# Patient Record
Sex: FEMALE | Race: OTHER | NOT HISPANIC OR LATINO
[De-identification: names, ages, dates, MRNs, and addresses within clinical notes are randomized per-mention and may not be internally consistent; named-entity substitution may affect disease eponyms.]

---

## 2017-05-04 ENCOUNTER — RX RENEWAL (OUTPATIENT)
Age: 72
End: 2017-05-04

## 2017-06-01 ENCOUNTER — LABORATORY RESULT (OUTPATIENT)
Age: 72
End: 2017-06-01

## 2017-06-05 ENCOUNTER — OUTPATIENT (OUTPATIENT)
Dept: OUTPATIENT SERVICES | Facility: HOSPITAL | Age: 72
LOS: 1 days | Discharge: HOME | End: 2017-06-05

## 2017-06-05 DIAGNOSIS — C50.919 MALIGNANT NEOPLASM OF UNSPECIFIED SITE OF UNSPECIFIED FEMALE BREAST: ICD-10-CM

## 2017-06-07 ENCOUNTER — APPOINTMENT (OUTPATIENT)
Dept: HEMATOLOGY ONCOLOGY | Facility: CLINIC | Age: 72
End: 2017-06-07

## 2017-06-07 VITALS
BODY MASS INDEX: 18.65 KG/M2 | TEMPERATURE: 97.8 F | DIASTOLIC BLOOD PRESSURE: 80 MMHG | RESPIRATION RATE: 16 BRPM | HEART RATE: 72 BPM | OXYGEN SATURATION: 99 % | SYSTOLIC BLOOD PRESSURE: 120 MMHG | WEIGHT: 119.05 LBS

## 2017-06-13 ENCOUNTER — APPOINTMENT (OUTPATIENT)
Dept: INTERNAL MEDICINE | Facility: CLINIC | Age: 72
End: 2017-06-13

## 2017-06-13 VITALS
HEART RATE: 68 BPM | HEIGHT: 67 IN | RESPIRATION RATE: 14 BRPM | DIASTOLIC BLOOD PRESSURE: 76 MMHG | SYSTOLIC BLOOD PRESSURE: 124 MMHG

## 2017-06-13 VITALS
WEIGHT: 119 LBS | SYSTOLIC BLOOD PRESSURE: 126 MMHG | RESPIRATION RATE: 14 BRPM | DIASTOLIC BLOOD PRESSURE: 78 MMHG | HEART RATE: 72 BPM | BODY MASS INDEX: 18.64 KG/M2

## 2017-07-24 ENCOUNTER — APPOINTMENT (OUTPATIENT)
Dept: SURGERY | Facility: CLINIC | Age: 72
End: 2017-07-24

## 2017-07-24 DIAGNOSIS — Z12.11 ENCOUNTER FOR SCREENING FOR MALIGNANT NEOPLASM OF COLON: ICD-10-CM

## 2017-08-01 ENCOUNTER — TRANSCRIPTION ENCOUNTER (OUTPATIENT)
Age: 72
End: 2017-08-01

## 2017-08-01 ENCOUNTER — OUTPATIENT (OUTPATIENT)
Dept: OUTPATIENT SERVICES | Facility: HOSPITAL | Age: 72
LOS: 1 days | Discharge: HOME | End: 2017-08-01
Payer: MEDICARE

## 2017-08-01 PROCEDURE — 45378 DIAGNOSTIC COLONOSCOPY: CPT

## 2017-08-01 PROCEDURE — G0105: CPT

## 2017-09-18 ENCOUNTER — OTHER (OUTPATIENT)
Age: 72
End: 2017-09-18

## 2017-10-30 ENCOUNTER — MEDICATION RENEWAL (OUTPATIENT)
Age: 72
End: 2017-10-30

## 2017-12-14 ENCOUNTER — OUTPATIENT (OUTPATIENT)
Dept: OUTPATIENT SERVICES | Facility: HOSPITAL | Age: 72
LOS: 1 days | Discharge: HOME | End: 2017-12-14

## 2017-12-14 DIAGNOSIS — C50.919 MALIGNANT NEOPLASM OF UNSPECIFIED SITE OF UNSPECIFIED FEMALE BREAST: ICD-10-CM

## 2017-12-15 ENCOUNTER — LABORATORY RESULT (OUTPATIENT)
Age: 72
End: 2017-12-15

## 2017-12-20 ENCOUNTER — APPOINTMENT (OUTPATIENT)
Dept: HEMATOLOGY ONCOLOGY | Facility: CLINIC | Age: 72
End: 2017-12-20
Payer: MEDICARE

## 2017-12-20 VITALS
HEART RATE: 79 BPM | BODY MASS INDEX: 18.65 KG/M2 | RESPIRATION RATE: 16 BRPM | WEIGHT: 119.05 LBS | DIASTOLIC BLOOD PRESSURE: 82 MMHG | TEMPERATURE: 97.9 F | OXYGEN SATURATION: 97 % | SYSTOLIC BLOOD PRESSURE: 134 MMHG

## 2017-12-20 PROCEDURE — 99213 OFFICE O/P EST LOW 20 MIN: CPT

## 2018-05-07 ENCOUNTER — MEDICATION RENEWAL (OUTPATIENT)
Age: 73
End: 2018-05-07

## 2018-06-22 ENCOUNTER — LABORATORY RESULT (OUTPATIENT)
Age: 73
End: 2018-06-22

## 2018-06-22 ENCOUNTER — OUTPATIENT (OUTPATIENT)
Dept: OUTPATIENT SERVICES | Facility: HOSPITAL | Age: 73
LOS: 1 days | Discharge: HOME | End: 2018-06-22

## 2018-06-22 DIAGNOSIS — C50.919 MALIGNANT NEOPLASM OF UNSPECIFIED SITE OF UNSPECIFIED FEMALE BREAST: ICD-10-CM

## 2018-06-27 ENCOUNTER — APPOINTMENT (OUTPATIENT)
Dept: HEMATOLOGY ONCOLOGY | Facility: CLINIC | Age: 73
End: 2018-06-27
Payer: MEDICARE

## 2018-06-27 VITALS
HEART RATE: 74 BPM | DIASTOLIC BLOOD PRESSURE: 78 MMHG | TEMPERATURE: 97.6 F | WEIGHT: 121.23 LBS | BODY MASS INDEX: 18.99 KG/M2 | SYSTOLIC BLOOD PRESSURE: 120 MMHG | OXYGEN SATURATION: 98 % | RESPIRATION RATE: 17 BRPM

## 2018-06-27 PROCEDURE — 99213 OFFICE O/P EST LOW 20 MIN: CPT

## 2018-06-27 RX ORDER — POLYETHYLENE GLYCOL 3350, SODIUM SULFATE, SODIUM CHLORIDE, POTASSIUM CHLORIDE, ASCORBIC ACID, SODIUM ASCORBATE 7.5-2.691G
100 KIT ORAL
Qty: 1 | Refills: 0 | Status: DISCONTINUED | COMMUNITY
Start: 2017-07-24 | End: 2018-06-27

## 2018-06-27 RX ORDER — OSELTAMIVIR PHOSPHATE 75 MG/1
75 CAPSULE ORAL DAILY
Qty: 10 | Refills: 0 | Status: DISCONTINUED | COMMUNITY
Start: 2018-02-21 | End: 2018-06-27

## 2018-08-01 ENCOUNTER — APPOINTMENT (OUTPATIENT)
Dept: INTERNAL MEDICINE | Facility: CLINIC | Age: 73
End: 2018-08-01
Payer: MEDICARE

## 2018-08-01 VITALS
SYSTOLIC BLOOD PRESSURE: 116 MMHG | BODY MASS INDEX: 23.56 KG/M2 | HEIGHT: 60 IN | WEIGHT: 120 LBS | DIASTOLIC BLOOD PRESSURE: 72 MMHG | RESPIRATION RATE: 16 BRPM | HEART RATE: 72 BPM

## 2018-08-01 PROCEDURE — 99214 OFFICE O/P EST MOD 30 MIN: CPT

## 2018-08-01 NOTE — HEALTH RISK ASSESSMENT
[No falls in past year] : Patient reported no falls in the past year [0] : 2) Feeling down, depressed, or hopeless: Not at all (0) [[id number]] : 0

## 2018-08-01 NOTE — HISTORY OF PRESENT ILLNESS
[Spouse] : spouse [FreeTextEntry1] : Comes to the office accompanied by her spouse for followup to discuss her medications and review her overall health [de-identified] : 73-year-old woman with a history of adenocarcinoma of the breast stage I on the right diabetes osteopenia and vitamin D deficiency comes to the office for followup accompanied by her  denies chest pain shortness of breath exertional dyspnea lightheadedness palpitations dizziness vertigo or syncope she has had no temperature chills sweats myalgias she denies cough dysuria headache sinus congestion pharyngitis wheezing or abdominal pain she's had no nausea vomiting diarrhea constipation rectal bleeding or melena her appetite has been good and her weight has been stable she is following a low carbohydrate diet she exercises regularly

## 2018-08-01 NOTE — REVIEW OF SYSTEMS
[Negative] : Heme/Lymph [Nasal Discharge] : nasal discharge [Fever] : no fever [Chills] : no chills [Fatigue] : no fatigue [Hot Flashes] : no hot flashes [Night Sweats] : no night sweats [Recent Change In Weight] : ~T no recent weight change [Discharge] : no discharge [Pain] : no pain [Redness] : no redness [Dryness] : no dryness  [Vision Problems] : no vision problems [Itching] : no itching [Earache] : no earache [Hearing Loss] : no hearing loss [Nosebleed] : no nosebleeds [Hoarseness] : no hoarseness [Sore Throat] : no sore throat [Postnasal Drip] : no postnasal drip [Chest Pain] : no chest pain [Palpitations] : no palpitations [Leg Claudication] : no leg claudication [Lower Ext Edema] : no lower extremity edema [Orthopnea] : no orthopnea [Paroysmal Nocturnal Dyspnea] : no paroysmal nocturnal dyspnea [Shortness Of Breath] : no shortness of breath [Wheezing] : no wheezing [Cough] : no cough [Dyspnea on Exertion] : no dyspnea on exertion [Abdominal Pain] : no abdominal pain [Nausea] : no nausea [Constipation] : no constipation [Diarrhea] : diarrhea [Vomiting] : no vomiting [Heartburn] : no heartburn [Melena] : no melena [Dysuria] : no dysuria [Incontinence] : no incontinence [Nocturia] : no nocturia [Poor Libido] : libido not poor [Hematuria] : no hematuria [Frequency] : no frequency [Vaginal Discharge] : no vaginal discharge [Dysmenorrhea] : no dysmenorrhea [Joint Pain] : no joint pain [Joint Stiffness] : no joint stiffness [Joint Swelling] : no joint swelling [Muscle Weakness] : no muscle weakness [Muscle Pain] : no muscle pain [Back Pain] : no back pain [Mole Changes] : no mole changes [Nail Changes] : no nail changes [Hair Changes] : no hair changes [Skin Rash] : no skin rash [Headache] : no headache [Dizziness] : no dizziness [Fainting] : no fainting [Memory Loss] : no memory loss [Unsteady Walking] : no ataxia [Suicidal] : not suicidal [Insomnia] : no insomnia [Anxiety] : no anxiety [Depression] : no depression [Easy Bleeding] : no easy bleeding [Easy Bruising] : no easy bruising [Swollen Glands] : no swollen glands

## 2018-08-01 NOTE — ASSESSMENT
[FreeTextEntry1] : Physical exam shows a well-developed female in no acute distress blood pressure 116/72 height 5 feet weight 120 pounds BMI 23.44 pulse is 72 and regular respiratory rate of 14 HEENT was unremarkable chest was clear cardiovascular exam was regular abdomen was soft and nontender extremities showed no clubbing cyanosis or edema neurologic exam was nonfocal...........Hemoglobin A1c was 6.6% previous hemoglobin A1c was 6.3% patient is maximizing her diet and exercise and with the level under 7% this is adequate.She is up-to-date on her ophthalmologist dermatologist gynecologist and will keep me informed of all tests done

## 2018-12-16 ENCOUNTER — OUTPATIENT (OUTPATIENT)
Dept: OUTPATIENT SERVICES | Facility: HOSPITAL | Age: 73
LOS: 1 days | Discharge: HOME | End: 2018-12-16

## 2018-12-16 DIAGNOSIS — C50.919 MALIGNANT NEOPLASM OF UNSPECIFIED SITE OF UNSPECIFIED FEMALE BREAST: ICD-10-CM

## 2018-12-20 ENCOUNTER — LABORATORY RESULT (OUTPATIENT)
Age: 73
End: 2018-12-20

## 2018-12-26 ENCOUNTER — APPOINTMENT (OUTPATIENT)
Dept: HEMATOLOGY ONCOLOGY | Facility: CLINIC | Age: 73
End: 2018-12-26
Payer: MEDICARE

## 2018-12-26 VITALS
TEMPERATURE: 97.7 F | OXYGEN SATURATION: 99 % | WEIGHT: 122.35 LBS | HEART RATE: 71 BPM | RESPIRATION RATE: 16 BRPM | SYSTOLIC BLOOD PRESSURE: 117 MMHG | BODY MASS INDEX: 23.9 KG/M2 | DIASTOLIC BLOOD PRESSURE: 73 MMHG

## 2018-12-26 PROCEDURE — 99213 OFFICE O/P EST LOW 20 MIN: CPT

## 2018-12-26 NOTE — REVIEW OF SYSTEMS
[Negative] : Allergic/Immunologic [FreeTextEntry2] : Energy level "off the charts". Declined  flu vaccination.  [FreeTextEntry7] : Last colonoscopy 08/2017, no polyps found [FreeTextEntry8] : Most recent appointment with GYN 01/2018, has upcoming appointment. Denies vaginal spotting/bleeding. [FreeTextEntry9] : Most recent bone density was 12/6/2018, stable osteopenia. [de-identified] : Was treated with calcipotriene and 5FU topically for precancerous lesions. [de-identified] : Fasting glucose was 155 on 6/22/2018. Hgb A1c 6.6.

## 2018-12-26 NOTE — HISTORY OF PRESENT ILLNESS
[Disease: _____________________] : Disease: [unfilled] [T: ___] : T[unfilled] [N: ___] : N[unfilled] [M: ___] : M[unfilled] [AJCC Stage: ____] : AJCC Stage: [unfilled] [Treatment Protocol] : Treatment Protocol [de-identified] : The patient presented in 1997, at age 52, with a mass in the right breast she discovered on breast self-examination.\par \par \par \par Dr. Tessy Bender performed an excisional biopsy on September 22, 1997 which demonstrated a 1.2 cm  well differentiated infiltrating ductal carcinoma which was ER positive (25%), LA positive (50%) and CerbB2 positive in 30% of the cells. There was a 10-20% component of DCIS.\par \par \par \par On October 13, 1997 the patient underwent right axillary lymph node dissection which demonstrated 24 negative axillary lymph nodes.\par \par \par \par Postoperatively the patient received adjuvant IV CMF extending from November 2, 1997 through April 27, 1998.\par \par \par \par The patient subsequently received radiation therapy at [facility] ([facility]) under the supervision of Dr. Rick Mendosa. \par \par \par \par The patient took tamoxifen from May 1998 through May 2003 and letrozole from March 2006 until March 2011.\par \par \par \par Amb CancerNext demonstrated variant of uncertain significance of BARD1(p.S761N) on 1/7/2015.\par \par \par \par  [de-identified] : well-differentiated infiltrating ductal carcinoma ER positive NH positive  [FreeTextEntry1] : On observation. [de-identified] : The patient has been 21  years 3 months  since breast cancer diagnosis. \par \par The patient completed chemotherapy 20 years 8 months ago. \par \par  The patient completed 5 years of Tamoxifen 15 years 7 months  ago and letrozole 7 years 3  months ago. \par \par Bilateral Mammogram/breast US 12/6/2018: breasts heterogeneously dense, Stable, BIRADS 2\par \par Most recent breast MRI 9/21/2017, BI RADS 2, \par \par Last saw breast surgeon Dr Bender in 01/2018, has follow up scheduled for 2/2019.\par \par Had yearly physical with PCP Dr Aamir Chicas 8/1/2019.\par \par No other intercurrent events.\par

## 2018-12-26 NOTE — REASON FOR VISIT
[Follow-Up Visit] : a follow-up [Other: _____] : [unfilled] [FreeTextEntry2] : infiltrating ductal carcinoma right breast right breast.

## 2018-12-26 NOTE — PHYSICAL EXAM
[Fully active, able to carry on all pre-disease performance without restriction] : Status 0 - Fully active, able to carry on all pre-disease performance without restriction [Thin] : thin [Normal] : affect appropriate [de-identified] : Right breast: scars UIQ and axilla,marked fibrocystic changes UOQ,  no mass. Left breast: scar UIQ,fibrocystic changes, no mass [de-identified] : Erythematous macules chest wall secondary to 5FU treatment.

## 2019-05-03 ENCOUNTER — APPOINTMENT (OUTPATIENT)
Dept: INTERNAL MEDICINE | Facility: CLINIC | Age: 74
End: 2019-05-03
Payer: MEDICARE

## 2019-05-03 ENCOUNTER — TRANSCRIPTION ENCOUNTER (OUTPATIENT)
Age: 74
End: 2019-05-03

## 2019-05-03 VITALS
HEIGHT: 60 IN | SYSTOLIC BLOOD PRESSURE: 120 MMHG | DIASTOLIC BLOOD PRESSURE: 74 MMHG | WEIGHT: 123 LBS | RESPIRATION RATE: 15 BRPM | BODY MASS INDEX: 24.15 KG/M2 | HEART RATE: 74 BPM

## 2019-05-03 VITALS — WEIGHT: 119 LBS | BODY MASS INDEX: 23.24 KG/M2

## 2019-05-03 LAB
CHOLEST SERPL-MCNC: 212 MG/DL
CHOLEST/HDLC SERPL: 3.1 RATIO
ESTIMATED AVERAGE GLUCOSE: 151 MG/DL
HBA1C MFR BLD HPLC: 6.9 %
HDLC SERPL-MCNC: 69 MG/DL
LDLC SERPL CALC-MCNC: 125 MG/DL
TRIGL SERPL-MCNC: 92 MG/DL

## 2019-05-03 PROCEDURE — 99215 OFFICE O/P EST HI 40 MIN: CPT

## 2019-05-04 NOTE — REVIEW OF SYSTEMS
[Nasal Discharge] : nasal discharge [Negative] : Heme/Lymph [Joint Stiffness] : joint stiffness [Fever] : no fever [Chills] : no chills [Fatigue] : no fatigue [Hot Flashes] : no hot flashes [Night Sweats] : no night sweats [Recent Change In Weight] : ~T no recent weight change [Discharge] : no discharge [Pain] : no pain [Redness] : no redness [Dryness] : no dryness  [Vision Problems] : no vision problems [Itching] : no itching [Earache] : no earache [Hearing Loss] : no hearing loss [Nosebleed] : no nosebleeds [Hoarseness] : no hoarseness [Sore Throat] : no sore throat [Postnasal Drip] : no postnasal drip [Palpitations] : no palpitations [Chest Pain] : no chest pain [Leg Claudication] : no leg claudication [Lower Ext Edema] : no lower extremity edema [Orthopnea] : no orthopnea [Paroysmal Nocturnal Dyspnea] : no paroysmal nocturnal dyspnea [Shortness Of Breath] : no shortness of breath [Wheezing] : no wheezing [Dyspnea on Exertion] : no dyspnea on exertion [Cough] : no cough [Abdominal Pain] : no abdominal pain [Nausea] : no nausea [Constipation] : no constipation [Diarrhea] : diarrhea [Vomiting] : no vomiting [Heartburn] : no heartburn [Melena] : no melena [Dysuria] : no dysuria [Incontinence] : no incontinence [Nocturia] : no nocturia [Poor Libido] : libido not poor [Hematuria] : no hematuria [Frequency] : no frequency [Vaginal Discharge] : no vaginal discharge [Joint Pain] : no joint pain [Dysmenorrhea] : no dysmenorrhea [Joint Swelling] : no joint swelling [Muscle Pain] : no muscle pain [Itching] : no itching [Back Pain] : no back pain [Muscle Weakness] : no muscle weakness [Mole Changes] : no mole changes [Hair Changes] : no hair changes [Nail Changes] : no nail changes [Headache] : no headache [Skin Rash] : no skin rash [Dizziness] : no dizziness [Fainting] : no fainting [Unsteady Walking] : no ataxia [Memory Loss] : no memory loss [Depression] : no depression [Insomnia] : no insomnia [Suicidal] : not suicidal [Anxiety] : no anxiety [Easy Bruising] : no easy bruising [Easy Bleeding] : no easy bleeding [Swollen Glands] : no swollen glands

## 2019-05-04 NOTE — HEALTH RISK ASSESSMENT
[No falls in past year] : Patient reported no falls in the past year [0] : 2) Feeling down, depressed, or hopeless: Not at all (0) [[id number]] : 0

## 2019-05-04 NOTE — HISTORY OF PRESENT ILLNESS
[Spouse] : spouse [FreeTextEntry1] : Comes to the office for followup to review her medications and discuss her overall health...She also is requesting repeat hemoglobin A1c and lipid profile [de-identified] : 74-year-old woman comes to the office accompanied by her  a history of type 2 diabetes adenocarcinoma of the breast stage I elevated cholesterol and vitamin D deficiency she denies temperature chills sweats or myalgias no headache sinus congestion sore throat cough wheezing pleurisy chest pain shortness of breath exertional dyspnea lightheadedness palpitations dizziness vertigo or syncope she has had no abdominal pain nausea vomiting diarrhea constipation bright red blood per rectum or black stools her appetite has been good her weight has been stable she exercises frequently denies significant musculoskeletal complaints

## 2019-05-04 NOTE — ASSESSMENT
[FreeTextEntry1] : Physical exam shows a well-developed female in no acute distress blood pressure 120/74 height 5 feet weight 119 pounds heart rate is 74 respirations 15 HEENT is unremarkable chest was clear cardiovascular was regular without murmurs abdomen was soft extremities showed no edema neurologic exam was nonfocal patient is up-to-date with her bone densities which show osteopenia and mammograms she had her last colonoscopy in August 2017 and keeps active with her ophthalmologist and dermatologist a slip was given for hemoglobin A1c and lipid profile she claims to be scheduled to have a blood tests with her oncologist

## 2019-05-04 NOTE — PHYSICAL EXAM
[Well Nourished] : well nourished [No Acute Distress] : no acute distress [Well Developed] : well developed [Well-Appearing] : well-appearing [Normal Voice/Communication] : normal voice/communication [Normal Sclera/Conjunctiva] : normal sclera/conjunctiva [PERRL] : pupils equal round and reactive to light [EOMI] : extraocular movements intact [Normal Outer Ear/Nose] : the outer ears and nose were normal in appearance [Normal Oropharynx] : the oropharynx was normal [Normal TMs] : both tympanic membranes were normal [Normal Nasal Mucosa] : the nasal mucosa was normal [No JVD] : no jugular venous distention [Supple] : supple [Thyroid Normal, No Nodules] : the thyroid was normal and there were no nodules present [No Lymphadenopathy] : no lymphadenopathy [Clear to Auscultation] : lungs were clear to auscultation bilaterally [No Accessory Muscle Use] : no accessory muscle use [No Respiratory Distress] : no respiratory distress  [Normal Percussion] : the chest was normal to percussion [Regular Rhythm] : with a regular rhythm [Normal Rate] : normal rate  [No Murmur] : no murmur heard [Normal S1, S2] : normal S1 and S2 [No Abdominal Bruit] : a ~M bruit was not heard ~T in the abdomen [No Carotid Bruits] : no carotid bruits [Pedal Pulses Present] : the pedal pulses are present [No Varicosities] : no varicosities [No Extremity Clubbing/Cyanosis] : no extremity clubbing/cyanosis [No Edema] : there was no peripheral edema [No Palpable Aorta] : no palpable aorta [Declined Breast Exam] : declined breast exam  [Soft] : abdomen soft [No Masses] : no abdominal mass palpated [Non-distended] : non-distended [Non Tender] : non-tender [No HSM] : no HSM [Normal Bowel Sounds] : normal bowel sounds [Declined Rectal Exam] : declined rectal exam [No Hernias] : no hernias [Normal Axillary Nodes] : no axillary lymphadenopathy [Normal Supraclavicular Nodes] : no supraclavicular lymphadenopathy [Normal Femoral Nodes] : no femoral lymphadenopathy [Normal Posterior Cervical Nodes] : no posterior cervical lymphadenopathy [Normal Inguinal Nodes] : no inguinal lymphadenopathy [Normal Anterior Cervical Nodes] : no anterior cervical lymphadenopathy [No CVA Tenderness] : no CVA  tenderness [No Spinal Tenderness] : no spinal tenderness [Grossly Normal Strength/Tone] : grossly normal strength/tone [No Joint Swelling] : no joint swelling [No Skin Lesions] : no skin lesions [No Rash] : no rash [Coordination Grossly Intact] : coordination grossly intact [Normal Gait] : normal gait [Deep Tendon Reflexes (DTR)] : deep tendon reflexes were 2+ and symmetric [No Focal Deficits] : no focal deficits [Speech Grossly Normal] : speech grossly normal [Memory Grossly Normal] : memory grossly normal [Normal Affect] : the affect was normal [Alert and Oriented x3] : oriented to person, place, and time [Normal Mood] : the mood was normal [Normal Insight/Judgement] : insight and judgment were intact [Kyphosis] : no kyphosis [Scoliosis] : no scoliosis [Acne] : no acne

## 2019-06-24 ENCOUNTER — OUTPATIENT (OUTPATIENT)
Dept: OUTPATIENT SERVICES | Facility: HOSPITAL | Age: 74
LOS: 1 days | Discharge: HOME | End: 2019-06-24

## 2019-06-24 DIAGNOSIS — C50.919 MALIGNANT NEOPLASM OF UNSPECIFIED SITE OF UNSPECIFIED FEMALE BREAST: ICD-10-CM

## 2019-06-25 LAB
ALBUMIN SERPL ELPH-MCNC: 4.9 G/DL
ALP BLD-CCNC: 78 U/L
ALT SERPL-CCNC: 21 U/L
ANION GAP SERPL CALC-SCNC: 13 MMOL/L
AST SERPL-CCNC: 19 U/L
BASOPHILS # BLD AUTO: 0.05 K/UL
BASOPHILS NFR BLD AUTO: 0.9 %
BILIRUB SERPL-MCNC: 1.5 MG/DL
BUN SERPL-MCNC: 11 MG/DL
CALCIUM SERPL-MCNC: 10 MG/DL
CHLORIDE SERPL-SCNC: 103 MMOL/L
CO2 SERPL-SCNC: 27 MMOL/L
CREAT SERPL-MCNC: 0.81 MG/DL
EOSINOPHIL # BLD AUTO: 0.09 K/UL
EOSINOPHIL NFR BLD AUTO: 1.6 %
GLUCOSE SERPL-MCNC: 160 MG/DL
HCT VFR BLD CALC: 44 %
HGB BLD-MCNC: 15 G/DL
IMM GRANULOCYTES NFR BLD AUTO: 0.4 %
LYMPHOCYTES # BLD AUTO: 1.54 K/UL
LYMPHOCYTES NFR BLD AUTO: 27.8 %
MAN DIFF?: NORMAL
MCHC RBC-ENTMCNC: 30.4 PG
MCHC RBC-ENTMCNC: 34.1 GM/DL
MCV RBC AUTO: 89.1 FL
MONOCYTES # BLD AUTO: 0.47 K/UL
MONOCYTES NFR BLD AUTO: 8.5 %
NEUTROPHILS # BLD AUTO: 3.37 K/UL
NEUTROPHILS NFR BLD AUTO: 60.8 %
PLATELET # BLD AUTO: 240 K/UL
POTASSIUM SERPL-SCNC: 4.4 MMOL/L
PROT SERPL-MCNC: 6.9 G/DL
RBC # BLD: 4.94 M/UL
RBC # FLD: 12.6 %
SODIUM SERPL-SCNC: 143 MMOL/L
WBC # FLD AUTO: 5.54 K/UL

## 2019-06-28 ENCOUNTER — APPOINTMENT (OUTPATIENT)
Dept: HEMATOLOGY ONCOLOGY | Facility: CLINIC | Age: 74
End: 2019-06-28
Payer: MEDICARE

## 2019-06-28 VITALS
BODY MASS INDEX: 23.85 KG/M2 | HEART RATE: 69 BPM | TEMPERATURE: 99.1 F | OXYGEN SATURATION: 100 % | WEIGHT: 122.11 LBS | SYSTOLIC BLOOD PRESSURE: 123 MMHG | RESPIRATION RATE: 16 BRPM | DIASTOLIC BLOOD PRESSURE: 77 MMHG

## 2019-06-28 PROCEDURE — 99213 OFFICE O/P EST LOW 20 MIN: CPT

## 2019-06-28 NOTE — REVIEW OF SYSTEMS
[Negative] : Integumentary [FreeTextEntry2] : Energy level  normal.. Declined  flu vaccination.  [FreeTextEntry6] : Has dry throat and occasional cough. [FreeTextEntry7] : Last colonoscopy 08/2017, no polyps found [FreeTextEntry8] : Most recent appointment with GYN 01/2019, Dr Mtz.  Denies vaginal spotting/bleeding. [FreeTextEntry9] : Most recent bone density was 12/6/2018, stable osteopenia. [de-identified] : Most recent derm exam spring 2019. [de-identified] : Fasting glucose was 155 on 6/22/2018. Hgb A1c 6.6.

## 2019-06-28 NOTE — HISTORY OF PRESENT ILLNESS
[Disease: _____________________] : Disease: [unfilled] [T: ___] : T[unfilled] [AJCC Stage: ____] : AJCC Stage: [unfilled] [M: ___] : M[unfilled] [N: ___] : N[unfilled] [Treatment Protocol] : Treatment Protocol [de-identified] : The patient presented in 1997, at age 52, with a mass in the right breast she discovered on breast self-examination.\par \par \par \par Dr. Tessy Bender performed an excisional biopsy on September 22, 1997 which demonstrated a 1.2 cm  well differentiated infiltrating ductal carcinoma which was ER positive (25%), FL positive (50%) and CerbB2 positive in 30% of the cells. There was a 10-20% component of DCIS.\par \par \par \par On October 13, 1997 the patient underwent right axillary lymph node dissection which demonstrated 24 negative axillary lymph nodes.\par \par \par \par Postoperatively the patient received adjuvant IV CMF extending from November 2, 1997 through April 27, 1998.\par \par \par \par The patient subsequently received radiation therapy at [facility] ([facility]) under the supervision of Dr. iRck Mendosa. \par \par \par \par The patient took tamoxifen from May 1998 through May 2003 and letrozole from March 2006 until March 2011.\par \par \par \par Amb CancerNext demonstrated variant of uncertain significance of BARD1(p.S761N) on 1/7/2015.\par \par \par \par  [de-identified] : well-differentiated infiltrating ductal carcinoma ER positive FL positive  [FreeTextEntry1] : On observation. [de-identified] : The patient has been 21  years 9 months  since breast cancer diagnosis. \par \par The patient completed chemotherapy 21 years 2 months ago. \par \par  The patient completed 5 years of Tamoxifen 16 years 1 month  ago and letrozole 7 years 9  months ago. \par \par Bilateral Mammogram/breast US 12/6/2018: breasts heterogeneously dense, Stable, BIRADS 2\par \par Last saw Dr Bender approximately 3/2019. Breast MRI was not ordered.\par \par Most recent breast MRI 9/21/2017, BI RADS 2..\par \par Had yearly physical with PCP Dr Aamir Chicas 5/3/2019.\par \par No other intercurrent events.\par

## 2019-06-28 NOTE — PHYSICAL EXAM
[Fully active, able to carry on all pre-disease performance without restriction] : Status 0 - Fully active, able to carry on all pre-disease performance without restriction [Thin] : thin [Normal] : grossly intact [de-identified] : Right breast: scars UIQ and axilla, fibrocystic changes UOQ,  no mass. Left breast: scar UIQ,fibrocystic changes, no mass [de-identified] : Erythematous macules chest wall secondary to 5FU treatment.

## 2019-07-11 ENCOUNTER — RX RENEWAL (OUTPATIENT)
Age: 74
End: 2019-07-11

## 2019-10-09 ENCOUNTER — APPOINTMENT (OUTPATIENT)
Dept: INTERNAL MEDICINE | Facility: CLINIC | Age: 74
End: 2019-10-09
Payer: MEDICARE

## 2019-10-09 ENCOUNTER — FORM ENCOUNTER (OUTPATIENT)
Age: 74
End: 2019-10-09

## 2019-10-09 VITALS
DIASTOLIC BLOOD PRESSURE: 78 MMHG | WEIGHT: 123 LBS | SYSTOLIC BLOOD PRESSURE: 126 MMHG | BODY MASS INDEX: 24.15 KG/M2 | HEIGHT: 60 IN | HEART RATE: 72 BPM | RESPIRATION RATE: 15 BRPM

## 2019-10-09 DIAGNOSIS — R14.0 ABDOMINAL DISTENSION (GASEOUS): ICD-10-CM

## 2019-10-09 PROCEDURE — 99215 OFFICE O/P EST HI 40 MIN: CPT

## 2019-10-10 ENCOUNTER — OUTPATIENT (OUTPATIENT)
Dept: OUTPATIENT SERVICES | Facility: HOSPITAL | Age: 74
LOS: 1 days | End: 2019-10-10
Payer: MEDICARE

## 2019-10-10 ENCOUNTER — APPOINTMENT (OUTPATIENT)
Dept: ULTRASOUND IMAGING | Facility: HOSPITAL | Age: 74
End: 2019-10-10
Payer: MEDICARE

## 2019-10-10 DIAGNOSIS — R14.0 ABDOMINAL DISTENSION (GASEOUS): ICD-10-CM

## 2019-10-10 PROCEDURE — 76700 US EXAM ABDOM COMPLETE: CPT | Mod: 26

## 2019-10-10 PROCEDURE — 76700 US EXAM ABDOM COMPLETE: CPT

## 2019-10-10 NOTE — HISTORY OF PRESENT ILLNESS
[Spouse] : spouse [FreeTextEntry1] : Comes to the office for followup to review her medications and to discuss her overall health she also wishes to receive a slip for comprehensive medical blood tests [de-identified] : A 74-year-old woman comes to the office accompanied by her  with a history of breast cancer type 2 diabetes hyperlipidemia osteopenia and vitamin D deficiency patient denies temperature chills sweats or myalgias she's had no headache sinus congestion sore throat cough wheezing pleurisy chest pain shortness of breath exertional dyspnea lightheadedness palpitations dizziness vertigo or syncope she denies abdominal pain occasional bloating she has had no nausea vomiting diarrhea constipation bright red blood per rectum or black stools her appetite has been good her weight has been stable she denies significant musculoskeletal complaints she has had no dysuria or gross hematuria she denies leg edema or any recent skin rashes

## 2019-10-10 NOTE — ASSESSMENT
[FreeTextEntry1] : Physical exam shows a well-developed female in no acute distress blood pressure 126/78 pulse is 72 respiratory rate of 15 HEENT was unremarkable chest was clear cardiovascular exam was regular with no extra heart sounds or murmurs abdomen was soft and nontender extremities showed no clubbing cyanosis or edema neurologic exam was nonfocal patient was given a slip for a mammogram and breast sonogram she also requests an abdominal ultrasound to investigate some nonspecific bloating slip also was given for complete blood tests including CBC full chemistries lipid profile thyroid profile hemoglobin A1c urinalysis CRP vitamins D3 and B12 she underwent her last mammogram in December of 2018 and follows closely with her oncologist and gynecologist Dr. Emily Walsh. She is up-to-date with her ophthalmologist and dermatologist and underwent her last colonoscopy in August of 2017

## 2019-10-10 NOTE — PHYSICAL EXAM
[No Acute Distress] : no acute distress [Well Developed] : well developed [Well Nourished] : well nourished [Well-Appearing] : well-appearing [Normal Voice/Communication] : normal voice/communication [Normal Sclera/Conjunctiva] : normal sclera/conjunctiva [EOMI] : extraocular movements intact [PERRL] : pupils equal round and reactive to light [Normal Outer Ear/Nose] : the outer ears and nose were normal in appearance [Normal TMs] : both tympanic membranes were normal [Normal Oropharynx] : the oropharynx was normal [Normal Nasal Mucosa] : the nasal mucosa was normal [No JVD] : no jugular venous distention [Supple] : supple [Thyroid Normal, No Nodules] : the thyroid was normal and there were no nodules present [No Lymphadenopathy] : no lymphadenopathy [No Respiratory Distress] : no respiratory distress  [No Accessory Muscle Use] : no accessory muscle use [Clear to Auscultation] : lungs were clear to auscultation bilaterally [Normal Percussion] : the chest was normal to percussion [Normal Rate] : normal rate  [Normal S1, S2] : normal S1 and S2 [Regular Rhythm] : with a regular rhythm [No Carotid Bruits] : no carotid bruits [No Murmur] : no murmur heard [No Abdominal Bruit] : a ~M bruit was not heard ~T in the abdomen [Pedal Pulses Present] : the pedal pulses are present [No Varicosities] : no varicosities [No Palpable Aorta] : no palpable aorta [No Edema] : there was no peripheral edema [Soft] : abdomen soft [No Extremity Clubbing/Cyanosis] : no extremity clubbing/cyanosis [Declined Breast Exam] : declined breast exam  [Non Tender] : non-tender [Non-distended] : non-distended [No HSM] : no HSM [Normal Bowel Sounds] : normal bowel sounds [No Masses] : no abdominal mass palpated [No Hernias] : no hernias [Declined Rectal Exam] : declined rectal exam [Normal Axillary Nodes] : no axillary lymphadenopathy [Normal Supraclavicular Nodes] : no supraclavicular lymphadenopathy [Normal Anterior Cervical Nodes] : no anterior cervical lymphadenopathy [Normal Posterior Cervical Nodes] : no posterior cervical lymphadenopathy [Normal Inguinal Nodes] : no inguinal lymphadenopathy [Normal Femoral Nodes] : no femoral lymphadenopathy [No CVA Tenderness] : no CVA  tenderness [No Spinal Tenderness] : no spinal tenderness [No Rash] : no rash [No Joint Swelling] : no joint swelling [Grossly Normal Strength/Tone] : grossly normal strength/tone [No Skin Lesions] : no skin lesions [Coordination Grossly Intact] : coordination grossly intact [Normal Gait] : normal gait [No Focal Deficits] : no focal deficits [Deep Tendon Reflexes (DTR)] : deep tendon reflexes were 2+ and symmetric [Memory Grossly Normal] : memory grossly normal [Speech Grossly Normal] : speech grossly normal [Normal Affect] : the affect was normal [Alert and Oriented x3] : oriented to person, place, and time [Normal Mood] : the mood was normal [Normal Insight/Judgement] : insight and judgment were intact [Kyphosis] : no kyphosis [Scoliosis] : no scoliosis [Acne] : no acne

## 2019-10-10 NOTE — REVIEW OF SYSTEMS
[Nasal Discharge] : nasal discharge [Joint Stiffness] : joint stiffness [Negative] : Neurological [Fever] : no fever [Chills] : no chills [Fatigue] : no fatigue [Hot Flashes] : no hot flashes [Night Sweats] : no night sweats [Discharge] : no discharge [Recent Change In Weight] : ~T no recent weight change [Pain] : no pain [Dryness] : no dryness  [Redness] : no redness [Vision Problems] : no vision problems [Itching] : no itching [Nosebleed] : no nosebleeds [Hearing Loss] : no hearing loss [Earache] : no earache [Hoarseness] : no hoarseness [Sore Throat] : no sore throat [Postnasal Drip] : no postnasal drip [Palpitations] : no palpitations [Chest Pain] : no chest pain [Leg Claudication] : no leg claudication [Lower Ext Edema] : no lower extremity edema [Orthopnea] : no orthopnea [Paroxysmal Nocturnal Dyspnea] : no paroxysmal nocturnal dyspnea [Shortness Of Breath] : no shortness of breath [Wheezing] : no wheezing [Dyspnea on Exertion] : no dyspnea on exertion [Abdominal Pain] : no abdominal pain [Cough] : no cough [Constipation] : no constipation [Nausea] : no nausea [Vomiting] : no vomiting [Diarrhea] : diarrhea [Heartburn] : no heartburn [Dysuria] : no dysuria [Melena] : no melena [Nocturia] : no nocturia [Incontinence] : no incontinence [Poor Libido] : libido not poor [Hematuria] : no hematuria [Vaginal Discharge] : no vaginal discharge [Frequency] : no frequency [Dysmenorrhea] : no dysmenorrhea [Joint Pain] : no joint pain [Joint Swelling] : no joint swelling [Muscle Weakness] : no muscle weakness [Muscle Pain] : no muscle pain [Back Pain] : no back pain [Mole Changes] : no mole changes [Hair Changes] : no hair changes [Nail Changes] : no nail changes [Skin Rash] : no skin rash [Headache] : no headache [Dizziness] : no dizziness [Memory Loss] : no memory loss [Fainting] : no fainting [Unsteady Walking] : no ataxia [Suicidal] : not suicidal [Insomnia] : no insomnia [Anxiety] : no anxiety [Depression] : no depression [Easy Bleeding] : no easy bleeding [Easy Bruising] : no easy bruising [Swollen Glands] : no swollen glands

## 2019-10-16 LAB
25(OH)D3 SERPL-MCNC: 55.1 NG/ML
ALBUMIN SERPL ELPH-MCNC: 5 G/DL
ALP BLD-CCNC: 85 U/L
ALT SERPL-CCNC: 19 U/L
ANION GAP SERPL CALC-SCNC: 12 MMOL/L
APPEARANCE: CLEAR
AST SERPL-CCNC: 19 U/L
BASOPHILS # BLD AUTO: 0.07 K/UL
BASOPHILS NFR BLD AUTO: 1.3 %
BILIRUB SERPL-MCNC: 1.1 MG/DL
BILIRUBIN URINE: NEGATIVE
BLOOD URINE: NEGATIVE
BUN SERPL-MCNC: 12 MG/DL
CALCIUM SERPL-MCNC: 9.7 MG/DL
CHLORIDE SERPL-SCNC: 103 MMOL/L
CHOLEST SERPL-MCNC: 220 MG/DL
CHOLEST/HDLC SERPL: 3.5 RATIO
CO2 SERPL-SCNC: 30 MMOL/L
COLOR: YELLOW
CREAT SERPL-MCNC: 0.65 MG/DL
CRP SERPL HS-MCNC: 0.22 MG/L
EOSINOPHIL # BLD AUTO: 0.09 K/UL
EOSINOPHIL NFR BLD AUTO: 1.7 %
ESTIMATED AVERAGE GLUCOSE: 128 MG/DL
GLUCOSE BS SERPL-MCNC: 116 MG/DL
GLUCOSE QUALITATIVE U: NEGATIVE
GLUCOSE SERPL-MCNC: 129 MG/DL
HBA1C MFR BLD HPLC: 6.1 %
HCT VFR BLD CALC: 43.2 %
HDLC SERPL-MCNC: 63 MG/DL
HGB BLD-MCNC: 14.7 G/DL
IMM GRANULOCYTES NFR BLD AUTO: 0.4 %
KETONES URINE: NEGATIVE
LDLC SERPL CALC-MCNC: 134 MG/DL
LEUKOCYTE ESTERASE URINE: NEGATIVE
LYMPHOCYTES # BLD AUTO: 1.68 K/UL
LYMPHOCYTES NFR BLD AUTO: 31.5 %
MAN DIFF?: NORMAL
MCHC RBC-ENTMCNC: 30.7 PG
MCHC RBC-ENTMCNC: 34 GM/DL
MCV RBC AUTO: 90.2 FL
MEV IGG FLD QL IA: >300 AU/ML
MEV IGG+IGM SER-IMP: POSITIVE
MONOCYTES # BLD AUTO: 0.38 K/UL
MONOCYTES NFR BLD AUTO: 7.1 %
MUV AB SER-ACNC: NEGATIVE
MUV IGG SER QL IA: 7.5 AU/ML
NEUTROPHILS # BLD AUTO: 3.09 K/UL
NEUTROPHILS NFR BLD AUTO: 58 %
NITRITE URINE: NEGATIVE
PH URINE: 6.5
PLATELET # BLD AUTO: 249 K/UL
POTASSIUM SERPL-SCNC: 4.4 MMOL/L
PROT SERPL-MCNC: 7 G/DL
PROTEIN URINE: NORMAL
RBC # BLD: 4.79 M/UL
RBC # FLD: 12.9 %
RUBV IGG FLD-ACNC: 6.9 INDEX
RUBV IGG SER-IMP: POSITIVE
SODIUM SERPL-SCNC: 145 MMOL/L
SPECIFIC GRAVITY URINE: 1.03
T4 FREE SERPL-MCNC: 1.1 NG/DL
TRIGL SERPL-MCNC: 116 MG/DL
TSH SERPL-ACNC: 0.48 UIU/ML
UROBILINOGEN URINE: NORMAL
VIT B12 SERPL-MCNC: 567 PG/ML
WBC # FLD AUTO: 5.33 K/UL

## 2020-01-07 ENCOUNTER — MEDICATION RENEWAL (OUTPATIENT)
Age: 75
End: 2020-01-07

## 2020-01-12 ENCOUNTER — OUTPATIENT (OUTPATIENT)
Dept: OUTPATIENT SERVICES | Facility: HOSPITAL | Age: 75
LOS: 1 days | Discharge: HOME | End: 2020-01-12

## 2020-01-12 DIAGNOSIS — C50.919 MALIGNANT NEOPLASM OF UNSPECIFIED SITE OF UNSPECIFIED FEMALE BREAST: ICD-10-CM

## 2020-01-12 LAB
ALBUMIN SERPL ELPH-MCNC: 4.4 G/DL
ALP BLD-CCNC: 75 U/L
ALT SERPL-CCNC: 18 U/L
ANION GAP SERPL CALC-SCNC: 13 MMOL/L
AST SERPL-CCNC: 18 U/L
BASOPHILS # BLD AUTO: 0.04 K/UL
BASOPHILS NFR BLD AUTO: 1 %
BILIRUB SERPL-MCNC: 0.8 MG/DL
BUN SERPL-MCNC: 12 MG/DL
CALCIUM SERPL-MCNC: 9.5 MG/DL
CHLORIDE SERPL-SCNC: 100 MMOL/L
CO2 SERPL-SCNC: 29 MMOL/L
CREAT SERPL-MCNC: 0.68 MG/DL
EOSINOPHIL # BLD AUTO: 0.14 K/UL
EOSINOPHIL NFR BLD AUTO: 3.3 %
GLUCOSE SERPL-MCNC: 109 MG/DL
HCT VFR BLD CALC: 41.1 %
HGB BLD-MCNC: 13.9 G/DL
IMM GRANULOCYTES NFR BLD AUTO: 0.2 %
LYMPHOCYTES # BLD AUTO: 1.48 K/UL
LYMPHOCYTES NFR BLD AUTO: 35.3 %
MAN DIFF?: NORMAL
MCHC RBC-ENTMCNC: 30 PG
MCHC RBC-ENTMCNC: 33.8 GM/DL
MCV RBC AUTO: 88.6 FL
MONOCYTES # BLD AUTO: 0.4 K/UL
MONOCYTES NFR BLD AUTO: 9.5 %
NEUTROPHILS # BLD AUTO: 2.12 K/UL
NEUTROPHILS NFR BLD AUTO: 50.7 %
PLATELET # BLD AUTO: 244 K/UL
POTASSIUM SERPL-SCNC: 4.2 MMOL/L
PROT SERPL-MCNC: 6.1 G/DL
RBC # BLD: 4.64 M/UL
RBC # FLD: 12.8 %
SODIUM SERPL-SCNC: 142 MMOL/L
WBC # FLD AUTO: 4.19 K/UL

## 2020-01-13 ENCOUNTER — APPOINTMENT (OUTPATIENT)
Dept: HEMATOLOGY ONCOLOGY | Facility: CLINIC | Age: 75
End: 2020-01-13
Payer: MEDICARE

## 2020-01-13 VITALS
OXYGEN SATURATION: 100 % | BODY MASS INDEX: 23.03 KG/M2 | RESPIRATION RATE: 16 BRPM | TEMPERATURE: 97.5 F | SYSTOLIC BLOOD PRESSURE: 131 MMHG | HEART RATE: 72 BPM | DIASTOLIC BLOOD PRESSURE: 72 MMHG | WEIGHT: 117.95 LBS

## 2020-01-13 PROCEDURE — 99214 OFFICE O/P EST MOD 30 MIN: CPT

## 2020-01-13 NOTE — REVIEW OF SYSTEMS
[Negative] : Respiratory [FreeTextEntry2] : Energy level "off the charts", doing flamenco dancing. normal.. Declined  flu vaccination.  Had both doses of Shingrix. [FreeTextEntry7] : Last colonoscopy 08/2017, no polyps found. Next colonoscopy 8/2020. [FreeTextEntry8] : Most recent appointment with GYN 01/2019, Dr Mtz.  Denies vaginal spotting/bleeding.Plans to schedule follow up. [FreeTextEntry9] : Most recent bone density was 12/6/2018, stable osteopenia. [de-identified] : Most recent derm exam 3/ 2019. patient states she is  up-to-date.. [de-identified] : See interval history

## 2020-01-13 NOTE — HISTORY OF PRESENT ILLNESS
[Disease: _____________________] : Disease: [unfilled] [T: ___] : T[unfilled] [N: ___] : N[unfilled] [M: ___] : M[unfilled] [Treatment Protocol] : Treatment Protocol [AJCC Stage: ____] : AJCC Stage: [unfilled] [de-identified] : The patient presented in 1997, at age 52, with a mass in the right breast she discovered on breast self-examination.\par \par \par \par Dr. Tessy Bender performed an excisional biopsy on September 22, 1997 which demonstrated a 1.2 cm  well differentiated infiltrating ductal carcinoma which was ER positive (25%), [place] positive (50%) and CerbB2 positive in 30% of the cells. There was a 10-20% component of DCIS.\par \par \par \par On October 13, 1997 the patient underwent right axillary lymph node dissection which demonstrated 24 negative axillary lymph nodes.\par \par \par \par Postoperatively the patient received adjuvant IV CMF extending from November 2, 1997 through April 27, 1998.\par \par \par \par The patient subsequently received radiation therapy at [facility] ([facility]) under the supervision of Dr. Rick Mendosa. 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 [de-identified] : The patient has been 22   years 3  months  since breast cancer diagnosis. \par \par The patient completed chemotherapy 21 years 8 months ago. \par \par  The patient completed 5 years of Tamoxifen 16 years 7  months  ago and letrozole 8  years 3  months ago. \par \par Bilateral Mammogram/breast US performed in 12/2019, patient was told it was OK. I will request report.\par .\par Last saw Dr Bender 3/2019.\par \par Most recent breast MRI 9/21/2017, BI RADS 2..\par \par Last saw PCP Dr Aamir Chicas 10/2019, metformin dose increased.\par \par Patient was sent for abdominal US 10/10/2019, 6.1 cm right renal cyst found.\par \par No other intercurrent events.\par

## 2020-01-13 NOTE — PHYSICAL EXAM
[Fully active, able to carry on all pre-disease performance without restriction] : Status 0 - Fully active, able to carry on all pre-disease performance without restriction [Thin] : thin [Normal] : normal appearance, no rash, nodules, vesicles, ulcers, erythema [de-identified] : Right breast: scars UIQ and axilla, fibrocystic changes UOQ,  no mass. Left breast: scar UIQ,fibrocystic changes, no mass

## 2020-06-01 ENCOUNTER — LABORATORY RESULT (OUTPATIENT)
Age: 75
End: 2020-06-01

## 2020-06-01 LAB
25(OH)D3 SERPL-MCNC: 56.7 NG/ML
ALBUMIN SERPL ELPH-MCNC: 4.9 G/DL
ALP BLD-CCNC: 70 U/L
ALT SERPL-CCNC: 17 U/L
ANION GAP SERPL CALC-SCNC: 16 MMOL/L
APPEARANCE: CLEAR
AST SERPL-CCNC: 19 U/L
BASOPHILS # BLD AUTO: 0.05 K/UL
BASOPHILS NFR BLD AUTO: 0.9 %
BILIRUB SERPL-MCNC: 1.3 MG/DL
BILIRUBIN URINE: NEGATIVE
BLOOD URINE: NEGATIVE
BUN SERPL-MCNC: 10 MG/DL
CALCIUM SERPL-MCNC: 9.9 MG/DL
CHLORIDE SERPL-SCNC: 100 MMOL/L
CHOLEST SERPL-MCNC: 217 MG/DL
CHOLEST/HDLC SERPL: 3.6 RATIO
CO2 SERPL-SCNC: 28 MMOL/L
COLOR: YELLOW
CREAT SERPL-MCNC: 0.66 MG/DL
CRP SERPL HS-MCNC: 0.24 MG/L
EOSINOPHIL # BLD AUTO: 0.12 K/UL
EOSINOPHIL NFR BLD AUTO: 2.1 %
ESTIMATED AVERAGE GLUCOSE: 131 MG/DL
GLUCOSE BS SERPL-MCNC: 118 MG/DL
GLUCOSE QUALITATIVE U: NEGATIVE
GLUCOSE SERPL-MCNC: 124 MG/DL
HBA1C MFR BLD HPLC: 6.2 %
HCT VFR BLD CALC: 43.2 %
HDLC SERPL-MCNC: 60 MG/DL
HGB BLD-MCNC: 14.7 G/DL
IMM GRANULOCYTES NFR BLD AUTO: 0.4 %
KETONES URINE: NEGATIVE
LDLC SERPL CALC-MCNC: 133 MG/DL
LEUKOCYTE ESTERASE URINE: NEGATIVE
LYMPHOCYTES # BLD AUTO: 1.54 K/UL
LYMPHOCYTES NFR BLD AUTO: 27.2 %
MAN DIFF?: NORMAL
MCHC RBC-ENTMCNC: 30.4 PG
MCHC RBC-ENTMCNC: 34 GM/DL
MCV RBC AUTO: 89.3 FL
MONOCYTES # BLD AUTO: 0.41 K/UL
MONOCYTES NFR BLD AUTO: 7.2 %
NEUTROPHILS # BLD AUTO: 3.53 K/UL
NEUTROPHILS NFR BLD AUTO: 62.2 %
NITRITE URINE: NEGATIVE
PH URINE: 7.5
PLATELET # BLD AUTO: 257 K/UL
POTASSIUM SERPL-SCNC: 4.2 MMOL/L
PROT SERPL-MCNC: 6.9 G/DL
PROTEIN URINE: ABNORMAL
RBC # BLD: 4.84 M/UL
RBC # FLD: 13 %
SODIUM SERPL-SCNC: 144 MMOL/L
SPECIFIC GRAVITY URINE: 1.02
T4 FREE SERPL-MCNC: 1.1 NG/DL
TRIGL SERPL-MCNC: 123 MG/DL
TSH SERPL-ACNC: 0.49 UIU/ML
UROBILINOGEN URINE: NORMAL
VIT B12 SERPL-MCNC: 537 PG/ML
WBC # FLD AUTO: 5.67 K/UL

## 2020-06-02 LAB
MEV IGG FLD QL IA: >300 AU/ML
MEV IGG+IGM SER-IMP: POSITIVE
MUV AB SER-ACNC: NEGATIVE
MUV IGG SER QL IA: 5.9 AU/ML
RUBV IGG FLD-ACNC: 6.5 INDEX
RUBV IGG SER-IMP: POSITIVE
SARS-COV-2 IGG SERPL IA-ACNC: <3.8 AU/ML
SARS-COV-2 IGG SERPL QL IA: NEGATIVE

## 2020-06-04 ENCOUNTER — APPOINTMENT (OUTPATIENT)
Dept: INTERNAL MEDICINE | Facility: CLINIC | Age: 75
End: 2020-06-04
Payer: MEDICARE

## 2020-06-04 PROCEDURE — G0444 DEPRESSION SCREEN ANNUAL: CPT | Mod: 59,95

## 2020-06-04 PROCEDURE — 99215 OFFICE O/P EST HI 40 MIN: CPT | Mod: 25,95

## 2020-06-04 NOTE — HISTORY OF PRESENT ILLNESS
[Home] : at home, [unfilled] , at the time of the visit. [Verbal consent obtained from patient] : the patient, [unfilled] [Medical Office: ([facility])___] : at the medical office located in  [Spouse] : spouse [FreeTextEntry1] : TELEHEALTH video visit  to review her medications and discuss her overall health patient also wishes to go over recently performed blood tests  [de-identified] : TELEHEALTH video visit.. 75-year-old woman with a history of breast cancer type 2 diabetes elevated cholesterol osteopenia and vitamin D deficiency her forms a video followup patient denies temperature chills sweats or myalgias she has had no headache sinus congestion sore throat cough wheezing pleurisy chest pain shortness of breath exertional dyspnea lightheadedness palpitations dizziness vertigo or syncope she denies abdominal pain nausea vomiting diarrhea or constipation bright red blood per rectum or black stools her appetite has been good her weight has been stable he denies significant musculoskeletal complaints denies dysuria or gross hematuria she has had no leg edema he denies any recent skin rashes she has been sleeping well he continues to be active and exercise daily

## 2020-06-04 NOTE — REVIEW OF SYSTEMS
[Nasal Discharge] : nasal discharge [Joint Stiffness] : joint stiffness [Negative] : Heme/Lymph [Fever] : no fever [Chills] : no chills [Fatigue] : no fatigue [Hot Flashes] : no hot flashes [Recent Change In Weight] : ~T no recent weight change [Night Sweats] : no night sweats [Discharge] : no discharge [Pain] : no pain [Redness] : no redness [Dryness] : no dryness  [Vision Problems] : no vision problems [Itching] : no itching [Earache] : no earache [Hearing Loss] : no hearing loss [Nosebleed] : no nosebleeds [Sore Throat] : no sore throat [Hoarseness] : no hoarseness [Postnasal Drip] : no postnasal drip [Palpitations] : no palpitations [Chest Pain] : no chest pain [Leg Claudication] : no leg claudication [Lower Ext Edema] : no lower extremity edema [Paroxysmal Nocturnal Dyspnea] : no paroxysmal nocturnal dyspnea [Orthopnea] : no orthopnea [Wheezing] : no wheezing [Shortness Of Breath] : no shortness of breath [Cough] : no cough [Dyspnea on Exertion] : no dyspnea on exertion [Abdominal Pain] : no abdominal pain [Nausea] : no nausea [Constipation] : no constipation [Diarrhea] : diarrhea [Vomiting] : no vomiting [Heartburn] : no heartburn [Melena] : no melena [Dysuria] : no dysuria [Incontinence] : no incontinence [Nocturia] : no nocturia [Poor Libido] : libido not poor [Frequency] : no frequency [Hematuria] : no hematuria [Vaginal Discharge] : no vaginal discharge [Dysmenorrhea] : no dysmenorrhea [Joint Pain] : no joint pain [Joint Swelling] : no joint swelling [Muscle Weakness] : no muscle weakness [Muscle Pain] : no muscle pain [Back Pain] : no back pain [Mole Changes] : no mole changes [Nail Changes] : no nail changes [Hair Changes] : no hair changes [Headache] : no headache [Skin Rash] : no skin rash [Dizziness] : no dizziness [Fainting] : no fainting [Memory Loss] : no memory loss [Unsteady Walking] : no ataxia [Suicidal] : not suicidal [Insomnia] : no insomnia [Anxiety] : no anxiety [Depression] : no depression [Easy Bleeding] : no easy bleeding [Easy Bruising] : no easy bruising [Swollen Glands] : no swollen glands

## 2020-06-04 NOTE — HEALTH RISK ASSESSMENT
[No] : No [No falls in past year] : Patient reported no falls in the past year [0] : 2) Feeling down, depressed, or hopeless: Not at all (0) [] : No [[id number]] : 0

## 2020-06-04 NOTE — ASSESSMENT
[FreeTextEntry1] : Patient follows actively with her oncologist concerning her breast cancer she is up-to-date with her ophthalmologist and dermatologist patient's recent laboratories were reviewed total cholesterol was 217 with an HDL of 60 and an LDL of 133 patient's hemoglobin A1c was 6.2% she will continue on her metformin she is diligent with her diet and exercises frequently last colonoscopy was in August of 2017 patient just received both doses of the new shingles vaccine and has received Prevnar 13.Her vitamin D level was excellent at 56.7

## 2020-08-05 ENCOUNTER — APPOINTMENT (OUTPATIENT)
Dept: SURGERY | Facility: CLINIC | Age: 75
End: 2020-08-05
Payer: MEDICARE

## 2020-08-05 DIAGNOSIS — Z12.11 ENCOUNTER FOR SCREENING FOR MALIGNANT NEOPLASM OF COLON: ICD-10-CM

## 2020-08-05 DIAGNOSIS — Z12.12 ENCOUNTER FOR SCREENING FOR MALIGNANT NEOPLASM OF COLON: ICD-10-CM

## 2020-08-05 PROCEDURE — 99202 OFFICE O/P NEW SF 15 MIN: CPT

## 2020-08-07 NOTE — PHYSICAL EXAM
[Normal Heart Sounds] : normal heart sounds [Normal Rate and Rhythm] : normal rate and rhythm [Normal Breath Sounds] : Normal breath sounds [Abdominal Masses] : No abdominal masses [Abdomen Tenderness] : ~T ~M No abdominal tenderness [de-identified] : nl [No Rash or Lesion] : No rash or lesion [de-identified] : nl [de-identified] : nl

## 2020-08-07 NOTE — HISTORY OF PRESENT ILLNESS
[de-identified] : This 75 year old woman last underwent a colonoscopy over three years ago. There is a strong FH of colon CA (father). The patient is asymptomatic regarding her GI tract.

## 2020-08-07 NOTE — ASSESSMENT
[FreeTextEntry1] : Long discussion regarding all options and risks\par Bowel prep written and explained\par Scheduled for colonoscopy

## 2020-08-15 ENCOUNTER — LABORATORY RESULT (OUTPATIENT)
Age: 75
End: 2020-08-15

## 2020-08-17 ENCOUNTER — TRANSCRIPTION ENCOUNTER (OUTPATIENT)
Age: 75
End: 2020-08-17

## 2020-08-18 ENCOUNTER — OUTPATIENT (OUTPATIENT)
Dept: OUTPATIENT SERVICES | Facility: HOSPITAL | Age: 75
LOS: 1 days | End: 2020-08-18
Payer: MEDICARE

## 2020-08-18 DIAGNOSIS — Z80.0 FAMILY HISTORY OF MALIGNANT NEOPLASM OF DIGESTIVE ORGANS: ICD-10-CM

## 2020-08-18 DIAGNOSIS — Z12.11 ENCOUNTER FOR SCREENING FOR MALIGNANT NEOPLASM OF COLON: ICD-10-CM

## 2020-08-18 DIAGNOSIS — Z86.010 PERSONAL HISTORY OF COLONIC POLYPS: ICD-10-CM

## 2020-08-18 LAB — GLUCOSE BLDC GLUCOMTR-MCNC: 126 MG/DL — HIGH (ref 70–99)

## 2020-08-18 PROCEDURE — 82962 GLUCOSE BLOOD TEST: CPT

## 2020-08-18 PROCEDURE — G0105: CPT

## 2020-08-18 PROCEDURE — 45378 DIAGNOSTIC COLONOSCOPY: CPT

## 2020-08-18 RX ORDER — SODIUM CHLORIDE 9 MG/ML
1000 INJECTION INTRAMUSCULAR; INTRAVENOUS; SUBCUTANEOUS
Refills: 0 | Status: DISCONTINUED | OUTPATIENT
Start: 2020-08-18 | End: 2020-09-02

## 2020-08-18 RX ADMIN — SODIUM CHLORIDE 75 MILLILITER(S): 9 INJECTION INTRAMUSCULAR; INTRAVENOUS; SUBCUTANEOUS at 12:13

## 2020-08-26 ENCOUNTER — OUTPATIENT (OUTPATIENT)
Dept: OUTPATIENT SERVICES | Facility: HOSPITAL | Age: 75
LOS: 1 days | Discharge: HOME | End: 2020-08-26

## 2020-08-26 DIAGNOSIS — C50.919 MALIGNANT NEOPLASM OF UNSPECIFIED SITE OF UNSPECIFIED FEMALE BREAST: ICD-10-CM

## 2020-08-31 LAB
ALBUMIN SERPL ELPH-MCNC: 4.8 G/DL
ALP BLD-CCNC: 69 U/L
ALT SERPL-CCNC: 15 U/L
ANION GAP SERPL CALC-SCNC: 13 MMOL/L
AST SERPL-CCNC: 19 U/L
BASOPHILS # BLD AUTO: 0.05 K/UL
BASOPHILS NFR BLD AUTO: 1 %
BILIRUB SERPL-MCNC: 1.3 MG/DL
BUN SERPL-MCNC: 10 MG/DL
CALCIUM SERPL-MCNC: 9.9 MG/DL
CHLORIDE SERPL-SCNC: 100 MMOL/L
CO2 SERPL-SCNC: 30 MMOL/L
CREAT SERPL-MCNC: 0.67 MG/DL
EOSINOPHIL # BLD AUTO: 0.14 K/UL
EOSINOPHIL NFR BLD AUTO: 2.7 %
GLUCOSE SERPL-MCNC: 104 MG/DL
HCT VFR BLD CALC: 41.7 %
HGB BLD-MCNC: 14.3 G/DL
IMM GRANULOCYTES NFR BLD AUTO: 0.2 %
LYMPHOCYTES # BLD AUTO: 2.15 K/UL
LYMPHOCYTES NFR BLD AUTO: 42.2 %
MAN DIFF?: NORMAL
MCHC RBC-ENTMCNC: 30.8 PG
MCHC RBC-ENTMCNC: 34.3 GM/DL
MCV RBC AUTO: 89.7 FL
MONOCYTES # BLD AUTO: 0.4 K/UL
MONOCYTES NFR BLD AUTO: 7.8 %
NEUTROPHILS # BLD AUTO: 2.35 K/UL
NEUTROPHILS NFR BLD AUTO: 46.1 %
PLATELET # BLD AUTO: 275 K/UL
POTASSIUM SERPL-SCNC: 4.5 MMOL/L
PROT SERPL-MCNC: 6.5 G/DL
RBC # BLD: 4.65 M/UL
RBC # FLD: 12.9 %
SODIUM SERPL-SCNC: 143 MMOL/L
WBC # FLD AUTO: 5.1 K/UL

## 2020-09-01 ENCOUNTER — APPOINTMENT (OUTPATIENT)
Dept: HEMATOLOGY ONCOLOGY | Facility: CLINIC | Age: 75
End: 2020-09-01
Payer: MEDICARE

## 2020-09-01 VITALS
WEIGHT: 113.76 LBS | RESPIRATION RATE: 16 BRPM | OXYGEN SATURATION: 100 % | SYSTOLIC BLOOD PRESSURE: 132 MMHG | HEIGHT: 65.04 IN | DIASTOLIC BLOOD PRESSURE: 74 MMHG | HEART RATE: 83 BPM | BODY MASS INDEX: 18.95 KG/M2 | TEMPERATURE: 98 F

## 2020-09-01 PROCEDURE — 99214 OFFICE O/P EST MOD 30 MIN: CPT

## 2020-09-06 NOTE — PHYSICAL EXAM
[Fully active, able to carry on all pre-disease performance without restriction] : Status 0 - Fully active, able to carry on all pre-disease performance without restriction [Thin] : thin [Normal] : affect appropriate [de-identified] : Right breast: scars UIQ and axilla, fibrocystic changes UOQ,  no mass. Left breast: scar UIQ,fibrocystic changes, no mass

## 2020-09-06 NOTE — REASON FOR VISIT
[Follow-Up Visit] : a follow-up [Other: _____] : [unfilled] [FreeTextEntry2] : infiltrating ductal carcinoma right breast

## 2020-09-06 NOTE — REVIEW OF SYSTEMS
[Negative] : Allergic/Immunologic [FreeTextEntry2] : Energy level "great", doing flamenco dancing, playing tennis .. Declined  flu vaccination.  Had both doses of Shingrix. [FreeTextEntry3] : Last eye exam, with benign findings. Plan to schedule f/u now. [FreeTextEntry9] : Most recent bone density was 12/6/2018, stable osteopenia. [FreeTextEntry8] : Most recent appointment with GYN Dr Mtz 01/2019, exam OK. Plan to call for f/u 10/2020. Denies vaginal spotting/bleeding.Plans to schedule follow up. [FreeTextEntry7] : Last colonoscopy 08/18/2020, no polyps found. See interval history. Appetite is good. [de-identified] : See interval history [de-identified] : Most recent dermatology evaluation  07/2020. Patient states she is  up-to-date..

## 2020-09-06 NOTE — HISTORY OF PRESENT ILLNESS
[Disease: _____________________] : Disease: [unfilled] [T: ___] : T[unfilled] [N: ___] : N[unfilled] [M: ___] : M[unfilled] [AJCC Stage: ____] : AJCC Stage: [unfilled] [Treatment Protocol] : Treatment Protocol [de-identified] : The patient presented in 1997, at age 52, with a mass in the right breast she discovered on breast self-examination.\par \par \par \par Dr. Tessy Bender performed an excisional biopsy on September 22, 1997 which demonstrated a 1.2 cm  well differentiated infiltrating ductal carcinoma which was ER positive (25%), [place] positive (50%) and CerbB2 positive in 30% of the cells. There was a 10-20% component of DCIS.\par \par \par \par On October 13, 1997 the patient underwent right axillary lymph node dissection which demonstrated 24 negative axillary lymph nodes.\par \par \par \par Postoperatively the patient received adjuvant IV CMF extending from November 2, 1997 through April 27, 1998.\par \par \par \par The patient subsequently received radiation therapy at [facility] ([facility]) under the supervision of Dr. Rick Mendosa. 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 \par \par The patient completed chemotherapy 22  years 3  months ago. \par \par  The patient completed 5 years of Tamoxifen 17  years 2  months  ago and letrozole 8  years 10  months ago. \par \par Bilateral Mammogram/breast US performed in 12/19/2019, BI RADS 2.\par \par Last saw Dr Bender 3/2019, exam OK. Next f/u fall 2020.\par \par Most recent breast MRI 9/21/2017, BI RADS 2..\par \par Last saw PCP Dr Aamir Chicas 6/04/2020 , metformin frequency was increased to BID.\par \par Had pre colonoscopy screening  with GI 8/5/2020 and colonoscopy  8/18/2020, no polyps as per patient.\par \par Overall the patient states she feels well since last seen.\par \par No other intercurrent events.\par  [de-identified] : well-differentiated infiltrating ductal carcinoma ER positive [place] positive  [FreeTextEntry1] : On observation.

## 2020-12-15 PROBLEM — Z12.11 ENCOUNTER FOR SCREENING COLONOSCOPY: Status: RESOLVED | Noted: 2017-07-24 | Resolved: 2020-12-15

## 2020-12-23 PROBLEM — Z12.11 ENCOUNTER FOR COLORECTAL CANCER SCREENING: Status: RESOLVED | Noted: 2020-08-05 | Resolved: 2020-12-23

## 2021-01-14 ENCOUNTER — APPOINTMENT (OUTPATIENT)
Dept: HEMATOLOGY ONCOLOGY | Facility: CLINIC | Age: 76
End: 2021-01-14

## 2021-02-06 ENCOUNTER — OUTPATIENT (OUTPATIENT)
Dept: OUTPATIENT SERVICES | Facility: HOSPITAL | Age: 76
LOS: 1 days | Discharge: HOME | End: 2021-02-06

## 2021-02-06 DIAGNOSIS — C50.919 MALIGNANT NEOPLASM OF UNSPECIFIED SITE OF UNSPECIFIED FEMALE BREAST: ICD-10-CM

## 2021-02-11 ENCOUNTER — APPOINTMENT (OUTPATIENT)
Dept: HEMATOLOGY ONCOLOGY | Facility: CLINIC | Age: 76
End: 2021-02-11
Payer: MEDICARE

## 2021-02-11 ENCOUNTER — LABORATORY RESULT (OUTPATIENT)
Age: 76
End: 2021-02-11

## 2021-02-11 VITALS
BODY MASS INDEX: 19.52 KG/M2 | OXYGEN SATURATION: 98 % | TEMPERATURE: 98.2 F | HEIGHT: 65.35 IN | WEIGHT: 118.61 LBS | SYSTOLIC BLOOD PRESSURE: 136 MMHG | RESPIRATION RATE: 16 BRPM | HEART RATE: 92 BPM | DIASTOLIC BLOOD PRESSURE: 72 MMHG

## 2021-02-11 PROCEDURE — 99214 OFFICE O/P EST MOD 30 MIN: CPT

## 2021-02-11 RX ORDER — SODIUM PICOSULFATE, MAGNESIUM OXIDE, AND ANHYDROUS CITRIC ACID 10; 3.5; 12 MG/160ML; G/160ML; G/160ML
10-3.5-12 MG-GM LIQUID ORAL
Qty: 1 | Refills: 0 | Status: DISCONTINUED | COMMUNITY
Start: 2020-08-05 | End: 2021-02-11

## 2021-02-11 RX ORDER — SODIUM PICOSULFATE, MAGNESIUM OXIDE, AND ANHYDROUS CITRIC ACID 10; 3.5; 12 MG/160ML; G/160ML; G/160ML
10-3.5-12 MG-GM LIQUID ORAL
Qty: 1 | Refills: 0 | Status: DISCONTINUED | COMMUNITY
Start: 2020-08-07 | End: 2021-02-11

## 2021-02-18 NOTE — PHYSICAL EXAM
[Fully active, able to carry on all pre-disease performance without restriction] : Status 0 - Fully active, able to carry on all pre-disease performance without restriction [Thin] : thin [Normal] : affect appropriate [de-identified] : Right breast: scars UIQ and axilla, fibrocystic changes UOQ,  no mass. Left breast: scar UIQ,fibrocystic changes, no mass

## 2021-02-18 NOTE — REVIEW OF SYSTEMS
[Negative] : Endocrine [FreeTextEntry2] : Energy level "great", doing [employer] dancing, playing tennis. Declined flu vaccination. Had both doses of Shingrix. Last saw PCP Dr Aamir Chicas 6/04/2020 , metformin frequency was increased to BID. [FreeTextEntry3] : Last eye exam late 2020, with benign findings. Beginnings of cataract. [FreeTextEntry7] : Last colonoscopy 08/18/2020, no polyps found. Due in 3 yrs  [FreeTextEntry8] : Most recent appointment with GYN Dr Mtz due to see in 3/2021. Denies vaginal spotting/bleeding. [de-identified] : Most recent dermatology evaluation 1/2021. Four joanne's procedure for precancerous lesions. Patient states she is up-to-date..

## 2021-02-18 NOTE — HISTORY OF PRESENT ILLNESS
[Disease: _____________________] : Disease: [unfilled] [T: ___] : T[unfilled] [N: ___] : N[unfilled] [M: ___] : M[unfilled] [AJCC Stage: ____] : AJCC Stage: [unfilled] [Treatment Protocol] : Treatment Protocol [de-identified] : The patient presented in 1997, at age 52, with a mass in the right breast she discovered on breast self-examination.\par \par Dr. Tessy Bender performed an excisional biopsy on September 22, 1997 which demonstrated a 1.2 cm well differentiated infiltrating ductal carcinoma which was ER positive (25%), [place] positive (50%) and CerbB2 positive in 30% of the cells. There was a 10-20% component of DCIS.\par \par On October 13, 1997 the patient underwent right axillary lymph node dissection which demonstrated 24 negative axillary lymph nodes.\par \par Postoperatively the patient received adjuvant IV CMF extending from November 2, 1997 through April 27, 1998.\par \par The patient subsequently received radiation therapy at [facility] ([facility]) under the supervision of Dr. Rick Mendosa. 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 [de-identified] : well-differentiated infiltrating ductal carcinoma ER positive [place] positive  [FreeTextEntry1] : On observation. [de-identified] : \par Bilateral Mammogram/breast US 1/4/2021 scarring in right breast 2:00, scarring in the left breast 11:00, cyst in left breast 1:00 1cmfn. BIRADS 2, no mammographic or sonographic evidence of malignancy. \par DEXA 1/4/2021 - osteopenia in spine -1.6, femoral neck -2.1, total hip -2.2\par Seeing Dr Bender 5/2021, last seen 10/2020 exam normal, seeing q6mos, Dr. Bender is retiring\par Most recent breast MRI 9/21/2017, BI RADS 2.\par Overall the patient states she feels well since last seen.\par No other intercurrent events.

## 2021-05-24 ENCOUNTER — RX RENEWAL (OUTPATIENT)
Age: 76
End: 2021-05-24

## 2021-07-22 ENCOUNTER — NON-APPOINTMENT (OUTPATIENT)
Age: 76
End: 2021-07-22

## 2021-07-22 ENCOUNTER — APPOINTMENT (OUTPATIENT)
Dept: CARDIOLOGY | Facility: CLINIC | Age: 76
End: 2021-07-22
Payer: MEDICARE

## 2021-07-22 VITALS
WEIGHT: 121 LBS | TEMPERATURE: 97.1 F | SYSTOLIC BLOOD PRESSURE: 123 MMHG | HEART RATE: 87 BPM | BODY MASS INDEX: 20.16 KG/M2 | OXYGEN SATURATION: 96 % | DIASTOLIC BLOOD PRESSURE: 78 MMHG | HEIGHT: 65 IN | RESPIRATION RATE: 16 BRPM

## 2021-07-22 PROCEDURE — 93000 ELECTROCARDIOGRAM COMPLETE: CPT

## 2021-07-22 PROCEDURE — 99204 OFFICE O/P NEW MOD 45 MIN: CPT

## 2021-07-22 NOTE — ASSESSMENT
[FreeTextEntry1] : Patient has elevated cardiac risk versus her cohort.  She has diabetes and remote chest radiotherapy although to the right side.  Modest elevation of cholesterol noted on over-the-counter therapy with red rice yeast.  Elevated [place] risk over 10 years

## 2021-07-22 NOTE — REASON FOR VISIT
[CV Risk Factors and Non-Cardiac Disease] : CV risk factors and non-cardiac disease [Hyperlipidemia] : hyperlipidemia [Spouse] : spouse [FreeTextEntry1] : Patient has a history of breast cancer treated by surgery chemotherapy and radiotherapy.  Heme-onc note reviewed reviewing that history.\par \par More recently she has been diagnosed with diabetes and takes Metformin.  She has had hyperlipidemia and takes red rice yeast.  Her endocrinologist suggested further evaluation for possible CAD.\par \par She eats in a very healthy manner feels well and denies chest pain dyspnea or palpitations.  She has no personal history of heart disease myocardial infarction angina valvular disease or hypertension.\par \par She is quite active physically.

## 2021-07-22 NOTE — DISCUSSION/SUMMARY
[FreeTextEntry1] : Discussed with patient and .  Will place her on statin and discontinue red rice yeast.  Obtain coronary calcium score and consider further work-up pending those results.  Risk benefits and alternatives discussed.  Questions addressed and patient amenable to above plan.  Repeat blood test on medication in 3 months.  Follow-up with PMD and her endocrinologist.

## 2021-07-28 ENCOUNTER — APPOINTMENT (OUTPATIENT)
Dept: CT IMAGING | Facility: CLINIC | Age: 76
End: 2021-07-28
Payer: SELF-PAY

## 2021-07-28 ENCOUNTER — OUTPATIENT (OUTPATIENT)
Dept: OUTPATIENT SERVICES | Facility: HOSPITAL | Age: 76
LOS: 1 days | End: 2021-07-28
Payer: SELF-PAY

## 2021-07-28 DIAGNOSIS — Z00.8 ENCOUNTER FOR OTHER GENERAL EXAMINATION: ICD-10-CM

## 2021-07-28 DIAGNOSIS — Z91.89 OTHER SPECIFIED PERSONAL RISK FACTORS, NOT ELSEWHERE CLASSIFIED: ICD-10-CM

## 2021-07-28 PROCEDURE — 75571 CT HRT W/O DYE W/CA TEST: CPT

## 2021-07-28 PROCEDURE — 75571 CT HRT W/O DYE W/CA TEST: CPT | Mod: 26

## 2021-08-10 ENCOUNTER — OUTPATIENT (OUTPATIENT)
Dept: OUTPATIENT SERVICES | Facility: HOSPITAL | Age: 76
LOS: 1 days | Discharge: HOME | End: 2021-08-10

## 2021-08-10 DIAGNOSIS — C50.919 MALIGNANT NEOPLASM OF UNSPECIFIED SITE OF UNSPECIFIED FEMALE BREAST: ICD-10-CM

## 2021-08-12 ENCOUNTER — APPOINTMENT (OUTPATIENT)
Dept: HEMATOLOGY ONCOLOGY | Facility: CLINIC | Age: 76
End: 2021-08-12
Payer: MEDICARE

## 2021-08-12 VITALS
BODY MASS INDEX: 19.69 KG/M2 | RESPIRATION RATE: 16 BRPM | SYSTOLIC BLOOD PRESSURE: 136 MMHG | HEART RATE: 80 BPM | HEIGHT: 64.96 IN | DIASTOLIC BLOOD PRESSURE: 79 MMHG | TEMPERATURE: 97.8 F | OXYGEN SATURATION: 97 % | WEIGHT: 118.17 LBS

## 2021-08-12 PROCEDURE — 99214 OFFICE O/P EST MOD 30 MIN: CPT

## 2021-08-12 NOTE — PHYSICAL EXAM
[Fully active, able to carry on all pre-disease performance without restriction] : Status 0 - Fully active, able to carry on all pre-disease performance without restriction [Thin] : thin [Normal] : affect appropriate [de-identified] : Right breast: scars UIQ and axilla, fibrocystic changes UOQ,  no mass. Left breast: scar UIQ,fibrocystic changes, no mass

## 2021-08-12 NOTE — HISTORY OF PRESENT ILLNESS
[Disease: _____________________] : Disease: [unfilled] [T: ___] : T[unfilled] [N: ___] : N[unfilled] [M: ___] : M[unfilled] [AJCC Stage: ____] : AJCC Stage: [unfilled] [Treatment Protocol] : Treatment Protocol [de-identified] : The patient presented in 1997, at age 52, with a mass in the right breast she discovered on breast self-examination.\par \par Dr. Tessy Bender performed an excisional biopsy on September 22, 1997 which demonstrated a 1.2 cm well differentiated infiltrating ductal carcinoma which was ER positive (25%), [place] positive (50%) and CerbB2 positive in 30% of the cells. There was a 10-20% component of DCIS.\par \par On October 13, 1997 the patient underwent right axillary lymph node dissection which demonstrated 24 negative axillary lymph nodes.\par \par Postoperatively the patient received adjuvant IV CMF extending from November 2, 1997 through April 27, 1998.\par \par The patient subsequently received radiation therapy at [facility] ([facility]) under the supervision of Dr. Rick Mendosa. 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 [de-identified] : well-differentiated infiltrating ductal carcinoma ER positive [place] positive  [FreeTextEntry1] : On observation. [de-identified] : \par On active surveillance\par Bilateral Mammogram/breast US 1/4/2021 scarring in right breast 2:00, scarring in the left breast 11:00, cyst in left breast 1:00 1cmfn. BIRADS 2, no mammographic or sonographic evidence of malignancy. \par Saw Dr Bender 5/2021, seeing q9mos, Dr. Bender is retiring\par Most recent breast MRI 9/21/2017, BI RADS 2.\par Repeat labs with cards/endo\par \par HEALTH MAINTENANCE\par Last saw PCP Dr Aamir Chicas 6/04/2020 , metformin frequency was increased to BID. Retiring, would like to follow with Dr. Swanson.\par Last eye exam late 2020, with benign findings. Beginnings of cataract.   is ophtho\par CARDS Dr. Misael Garcia - calcium score elevated, nuclear stress test recommended, pt debating\par ENDO Dr. Juana Swanson - osteopenia, DEXA 1/4/2021 - osteopenia in spine -1.6, femoral neck -2.1, total hip -2.2.  On calcium/vit D.  On atorvastatin 40mg daily;\par Last colonoscopy 08/18/2020, no polyps found. Due in 3 yrs \par Most recent appointment with GYN Dr Mtz, going to see 9/2021. Denies vaginal spotting/bleeding.\par Most recent dermatology evaluation 1/2021. Four joanne's procedure for precancerous lesions. Patient states she is up-to-date..\par Father was an physician, into preventive medicine\par Active - cardio and weight lifting \par COVID vaccine Pfizer 3/2021

## 2021-08-12 NOTE — ASSESSMENT
[FreeTextEntry1] : Stage I infiltrating ductal carcinoma right breast\par     Status post lumpectomy/ALND\par     Status post CMF\par     Status post RT\par     Status post tamoxifen x5 years\par     Status post letrozole x5 years.\par     The patient is clinically stable.\par     The patient's next mammogram/breast US will be 1/2022.\par     Labs will be done with cards/endo.

## 2021-08-13 ENCOUNTER — RX RENEWAL (OUTPATIENT)
Age: 76
End: 2021-08-13

## 2021-09-13 ENCOUNTER — RX RENEWAL (OUTPATIENT)
Age: 76
End: 2021-09-13

## 2021-10-25 ENCOUNTER — APPOINTMENT (OUTPATIENT)
Dept: CARDIOLOGY | Facility: CLINIC | Age: 76
End: 2021-10-25
Payer: MEDICARE

## 2021-10-25 PROCEDURE — 78452 HT MUSCLE IMAGE SPECT MULT: CPT

## 2021-10-25 PROCEDURE — 93015 CV STRESS TEST SUPVJ I&R: CPT

## 2021-10-25 PROCEDURE — A9500: CPT

## 2021-11-04 ENCOUNTER — RX RENEWAL (OUTPATIENT)
Age: 76
End: 2021-11-04

## 2021-11-04 ENCOUNTER — NON-APPOINTMENT (OUTPATIENT)
Age: 76
End: 2021-11-04

## 2021-11-04 ENCOUNTER — OUTPATIENT (OUTPATIENT)
Dept: OUTPATIENT SERVICES | Facility: HOSPITAL | Age: 76
LOS: 1 days | Discharge: HOME | End: 2021-11-04

## 2021-11-04 ENCOUNTER — APPOINTMENT (OUTPATIENT)
Dept: HEMATOLOGY ONCOLOGY | Facility: CLINIC | Age: 76
End: 2021-11-04
Payer: MEDICARE

## 2021-11-04 VITALS
SYSTOLIC BLOOD PRESSURE: 146 MMHG | WEIGHT: 116.82 LBS | DIASTOLIC BLOOD PRESSURE: 75 MMHG | HEIGHT: 64.96 IN | TEMPERATURE: 98.6 F | RESPIRATION RATE: 16 BRPM | HEART RATE: 82 BPM | OXYGEN SATURATION: 99 % | BODY MASS INDEX: 19.46 KG/M2

## 2021-11-04 DIAGNOSIS — N64.4 MASTODYNIA: ICD-10-CM

## 2021-11-04 DIAGNOSIS — C50.919 MALIGNANT NEOPLASM OF UNSPECIFIED SITE OF UNSPECIFIED FEMALE BREAST: ICD-10-CM

## 2021-11-04 PROCEDURE — 99214 OFFICE O/P EST MOD 30 MIN: CPT

## 2021-11-04 NOTE — HISTORY OF PRESENT ILLNESS
[Disease: _____________________] : Disease: [unfilled] [T: ___] : T[unfilled] [N: ___] : N[unfilled] [M: ___] : M[unfilled] [AJCC Stage: ____] : AJCC Stage: [unfilled] [Treatment Protocol] : Treatment Protocol [de-identified] : The patient presented in 1997, at age 52, with a mass in the right breast she discovered on breast self-examination.\par \par Dr. Tessy Bender performed an excisional biopsy on September 22, 1997 which demonstrated a 1.2 cm well differentiated infiltrating ductal carcinoma which was ER positive (25%), [place] positive (50%) and CerbB2 positive in 30% of the cells. There was a 10-20% component of DCIS.\par \par On October 13, 1997 the patient underwent right axillary lymph node dissection which demonstrated 24 negative axillary lymph nodes.\par \par Postoperatively the patient received adjuvant IV CMF extending from November 2, 1997 through April 27, 1998.\par \par The patient subsequently received radiation therapy at [facility] ([facility]) under the supervision of Dr. Rick Mendosa. 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 [de-identified] : well-differentiated infiltrating ductal carcinoma ER positive [place] positive  [FreeTextEntry1] : On observation. [de-identified] : 11/4/21\par Patient presents today emergently w/ a two week history of diffuse right breast pain; this is new and is present all the time; heat helps w/ discomfort; no fever, no redness, no swelling noted.  Patient does note discharge from right nipple that she saw on her bra - one occurrence. \par \par Bilateral Mammogram/breast US 1/4/2021 scarring in right breast 2:00, scarring in the left breast 11:00, cyst in left breast 1:00 1cmfn. BIRADS 2, no mammographic or sonographic evidence of malignancy. \par \par Saw Dr Bender's associate who did not feel there was any acute abnormality.\par \par Most recent breast MRI 9/21/2017, BI RADS 2.\par \par \par HEALTH MAINTENANCE\par Last saw PCP Dr Aamir Chicas 6/04/2020 , metformin frequency was increased to BID. Retiring, would like to follow with Dr. Swanson.\par Last eye exam late 2020, with benign findings. Beginnings of cataract.   is ophtho\par CARDS Dr. Misael Garcia - calcium score elevated, nuclear stress test recommended, pt debating\par ENDO Dr. Juana Swanson - osteopenia, DEXA 1/4/2021 - osteopenia in spine -1.6, femoral neck -2.1, total hip -2.2.  On calcium/vit D.  On atorvastatin 40mg daily;\par Last colonoscopy 08/18/2020, no polyps found. Due in 3 yrs \par Most recent appointment with GYN Dr Mtz, going to see 9/2021. Denies vaginal spotting/bleeding.\par Most recent dermatology evaluation 1/2021. Four joanne's procedure for precancerous lesions. Patient states she is up-to-date..\par Father was an physician, into preventive medicine\par Active - cardio and weight lifting \par COVID vaccine Pfizer 3/2021 and booster 9/21

## 2021-11-04 NOTE — ASSESSMENT
[FreeTextEntry1] : Stage I infiltrating ductal carcinoma right breast 1997\par     Status post lumpectomy/ALND\par     Status post CMF\par     Status post RT\par     Status post tamoxifen x5 years\par     Status post letrozole x5 years.\par \par New onset right breast pain and nipple discharge (non-bloody)\par - no evidence of cellulitis or absence\par - no palpable mass on exam\par - Plan:  Mammo/Sono scheduled 11/2021;   MRI Breast w/ and w/out contrast if negative Mammo/Sono to fully evaluate new onset pain

## 2021-11-04 NOTE — PHYSICAL EXAM
[Fully active, able to carry on all pre-disease performance without restriction] : Status 0 - Fully active, able to carry on all pre-disease performance without restriction [Thin] : thin [Normal] : affect appropriate [de-identified] : Right breast: scars UIQ and axilla, fibrocystic changes UOQ,  no mass. Left breast: scar UIQ,fibrocystic changes, no mass; dense breasts b/l; no nipple discharge illicted; no adenopathy bilateral; no erythema or swelling of the breast.

## 2021-11-08 ENCOUNTER — APPOINTMENT (OUTPATIENT)
Dept: CARDIOLOGY | Facility: CLINIC | Age: 76
End: 2021-11-08
Payer: MEDICARE

## 2021-11-08 ENCOUNTER — NON-APPOINTMENT (OUTPATIENT)
Age: 76
End: 2021-11-08

## 2021-11-08 VITALS
OXYGEN SATURATION: 97 % | RESPIRATION RATE: 16 BRPM | BODY MASS INDEX: 19.81 KG/M2 | WEIGHT: 116 LBS | SYSTOLIC BLOOD PRESSURE: 111 MMHG | TEMPERATURE: 97.9 F | HEART RATE: 79 BPM | HEIGHT: 64 IN | DIASTOLIC BLOOD PRESSURE: 71 MMHG

## 2021-11-08 PROCEDURE — 99214 OFFICE O/P EST MOD 30 MIN: CPT

## 2021-11-08 PROCEDURE — 93000 ELECTROCARDIOGRAM COMPLETE: CPT

## 2021-11-08 NOTE — REASON FOR VISIT
[CV Risk Factors and Non-Cardiac Disease] : CV risk factors and non-cardiac disease [Hyperlipidemia] : hyperlipidemia [Spouse] : spouse

## 2021-11-08 NOTE — ASSESSMENT
[FreeTextEntry1] : Patient has significantly improved risk profile and LDL on therapy.  Slight elevation of LFTs noted.  She is diabetic with hemoglobin A1c 6.8 followed by endocrinologist.\par \par Discussed with patient and  in detail risk benefits.  [place] risk score has basically been cut in half with use of statin.  She will continue current dosing of Lipitor,  and lifestyle.  Follow-up with her other physicians.  Labs reviewed.  See me annually or as needed.

## 2021-11-08 NOTE — CARDIOLOGY SUMMARY
[de-identified] : November 8, 2021 sinus rhythm  [de-identified] : October 25th 2021 normal nuclear stress

## 2021-11-08 NOTE — HISTORY OF PRESENT ILLNESS
[FreeTextEntry1] : Follow-up visit accompanied by her  tolerating statin without problem has history of diabetes calcium score over 100.  Is active physically follows with vascular.  Diet.  No chest pain dyspnea palpitations.  Seeing endocrinologist Dr. Swanson.  Had recent blood testing which was reviewed and discussed.

## 2022-01-02 ENCOUNTER — OUTPATIENT (OUTPATIENT)
Dept: OUTPATIENT SERVICES | Facility: HOSPITAL | Age: 77
LOS: 1 days | End: 2022-01-02
Payer: MEDICARE

## 2022-01-02 DIAGNOSIS — Z20.828 CONTACT WITH AND (SUSPECTED) EXPOSURE TO OTHER VIRAL COMMUNICABLE DISEASES: ICD-10-CM

## 2022-01-02 PROCEDURE — U0005: CPT

## 2022-01-02 PROCEDURE — U0003: CPT

## 2022-02-23 ENCOUNTER — APPOINTMENT (OUTPATIENT)
Dept: INTERNAL MEDICINE | Facility: CLINIC | Age: 77
End: 2022-02-23
Payer: MEDICARE

## 2022-02-23 VITALS
SYSTOLIC BLOOD PRESSURE: 109 MMHG | TEMPERATURE: 97.4 F | WEIGHT: 116 LBS | OXYGEN SATURATION: 97 % | DIASTOLIC BLOOD PRESSURE: 57 MMHG | HEART RATE: 84 BPM | RESPIRATION RATE: 15 BRPM | HEIGHT: 64 IN | BODY MASS INDEX: 19.81 KG/M2

## 2022-02-23 PROCEDURE — 99215 OFFICE O/P EST HI 40 MIN: CPT

## 2022-03-01 NOTE — ASSESSMENT
[FreeTextEntry1] : Physical examination reveals a well-developed woman in no acute distress blood pressure 109/57 height 5 feet 4 inches weight 116 pounds BMI 19.91 temperature 97.4 °F heart rate of 84 respirations 15 oxygen saturation on room air 97% HEENT was unremarkable chest was clear cardiovascular exam was regular abdomen was soft extremities showed no edema neurologic exam was nonfocal patient followed closely by Dr. Garcia cardiology had a stress test in October 2021 and in July 2021 had a calcium score CT scan patient had her last mammography January 2021 and bone sent to the in January 2021 patient had recent blood test performed by her endocrinologist with a hemoglobin A1c of 7.1% patient follows closely with her hematologist oncologist concerning her breast cancer she she has received the Shingrix vaccine and the Prevnar 13 vaccine and will supply the dates of her COVID-19 vaccine patient's last colonoscopy was in August 2000 22-year-old for osteopenia the patient is exercising and supplementing her vitamin D

## 2022-03-01 NOTE — REVIEW OF SYSTEMS
[Fever] : no fever [Chills] : no chills [Fatigue] : no fatigue [Hot Flashes] : no hot flashes [Night Sweats] : no night sweats [Recent Change In Weight] : ~T no recent weight change [Discharge] : no discharge [Pain] : no pain [Redness] : no redness [Dryness] : no dryness  [Vision Problems] : no vision problems [Itching] : no itching [Earache] : no earache [Hearing Loss] : no hearing loss [Nosebleed] : no nosebleeds [Hoarseness] : no hoarseness [Nasal Discharge] : nasal discharge [Sore Throat] : no sore throat [Postnasal Drip] : no postnasal drip [Chest Pain] : no chest pain [Palpitations] : no palpitations [Leg Claudication] : no leg claudication [Lower Ext Edema] : no lower extremity edema [Orthopnea] : no orthopnea [Paroxysmal Nocturnal Dyspnea] : no paroxysmal nocturnal dyspnea [Shortness Of Breath] : no shortness of breath [Wheezing] : no wheezing [Cough] : no cough [Dyspnea on Exertion] : no dyspnea on exertion [Abdominal Pain] : no abdominal pain [Nausea] : no nausea [Constipation] : no constipation [Diarrhea] : diarrhea [Vomiting] : no vomiting [Heartburn] : no heartburn [Melena] : no melena [Dysuria] : no dysuria [Incontinence] : no incontinence [Nocturia] : nocturia [Poor Libido] : libido not poor [Hematuria] : no hematuria [Frequency] : no frequency [Vaginal Discharge] : no vaginal discharge [Dysmenorrhea] : no dysmenorrhea [Joint Pain] : no joint pain [Joint Stiffness] : joint stiffness [Joint Swelling] : no joint swelling [Muscle Weakness] : no muscle weakness [Muscle Pain] : no muscle pain [Back Pain] : no back pain [Itching] : no itching [Mole Changes] : no mole changes [Nail Changes] : no nail changes [Hair Changes] : no hair changes [Skin Rash] : no skin rash [Headache] : no headache [Dizziness] : no dizziness [Fainting] : no fainting [Memory Loss] : no memory loss [Unsteady Walking] : no ataxia [Suicidal] : not suicidal [Insomnia] : no insomnia [Anxiety] : no anxiety [Depression] : no depression [Easy Bleeding] : no easy bleeding [Easy Bruising] : no easy bruising [Swollen Glands] : no swollen glands [Negative] : Heme/Lymph

## 2022-03-01 NOTE — PHYSICAL EXAM
[No Acute Distress] : no acute distress [Well Nourished] : well nourished [Well Developed] : well developed [Well-Appearing] : well-appearing [Normal Voice/Communication] : normal voice/communication [Normal Sclera/Conjunctiva] : normal sclera/conjunctiva [PERRL] : pupils equal round and reactive to light [EOMI] : extraocular movements intact [Normal Outer Ear/Nose] : the outer ears and nose were normal in appearance [Normal Oropharynx] : the oropharynx was normal [Normal TMs] : both tympanic membranes were normal [Normal Nasal Mucosa] : the nasal mucosa was normal [No JVD] : no jugular venous distention [No Lymphadenopathy] : no lymphadenopathy [Supple] : supple [Thyroid Normal, No Nodules] : the thyroid was normal and there were no nodules present [No Respiratory Distress] : no respiratory distress  [No Accessory Muscle Use] : no accessory muscle use [Clear to Auscultation] : lungs were clear to auscultation bilaterally [Normal Rate] : normal rate  [Regular Rhythm] : with a regular rhythm [Normal S1, S2] : normal S1 and S2 [No Murmur] : no murmur heard [No Carotid Bruits] : no carotid bruits [No Abdominal Bruit] : a ~M bruit was not heard ~T in the abdomen [No Varicosities] : no varicosities [Pedal Pulses Present] : the pedal pulses are present [No Edema] : there was no peripheral edema [No Palpable Aorta] : no palpable aorta [No Extremity Clubbing/Cyanosis] : no extremity clubbing/cyanosis [Declined Breast Exam] : declined breast exam  [Soft] : abdomen soft [Non Tender] : non-tender [Non-distended] : non-distended [No Masses] : no abdominal mass palpated [No HSM] : no HSM [Normal Bowel Sounds] : normal bowel sounds [Declined Rectal Exam] : declined rectal exam [Normal Supraclavicular Nodes] : no supraclavicular lymphadenopathy [Normal Axillary Nodes] : no axillary lymphadenopathy [Normal Posterior Cervical Nodes] : no posterior cervical lymphadenopathy [Normal Anterior Cervical Nodes] : no anterior cervical lymphadenopathy [Normal Inguinal Nodes] : no inguinal lymphadenopathy [Normal Femoral Nodes] : no femoral lymphadenopathy [No CVA Tenderness] : no CVA  tenderness [No Spinal Tenderness] : no spinal tenderness [Kyphosis] : no kyphosis [Scoliosis] : no scoliosis [No Joint Swelling] : no joint swelling [Grossly Normal Strength/Tone] : grossly normal strength/tone [No Rash] : no rash [No Skin Lesions] : no skin lesions [Acne] : no acne [Coordination Grossly Intact] : coordination grossly intact [No Focal Deficits] : no focal deficits [Normal Gait] : normal gait [Deep Tendon Reflexes (DTR)] : deep tendon reflexes were 2+ and symmetric [Speech Grossly Normal] : speech grossly normal [Memory Grossly Normal] : memory grossly normal [Normal Affect] : the affect was normal [Alert and Oriented x3] : oriented to person, place, and time [Normal Mood] : the mood was normal [Normal Insight/Judgement] : insight and judgment were intact

## 2022-03-01 NOTE — HISTORY OF PRESENT ILLNESS
[Spouse] : spouse [FreeTextEntry1] : 77-year-old woman comes to the office for follow-up to review her medications and discuss her overall health recently undergoing cardiac work-up including stress test and calcium score [de-identified] : Comes to the office for follow-up with a history of invasive ductal carcinoma of the right breast diabetes type 2 elevated cholesterol osteopenia vitamin D deficiency denies temperature chills sweats or myalgias has had no headache sinus congestion sore throat cough wheezing pleurisy chest pain shortness of breath exertional dyspnea lightheadedness palpitations dizziness vertigo or syncope denies abdominal pain nausea vomiting diarrhea constipation bright red blood per rectum or black stools appetite has been good weight has been stable denies leg edema skin rashes significant musculoskeletal complaints does occasionally get up at night to urinate but denies dysuria or gross hematuria has had no bouts of anxiety and depression has been sleeping adequately

## 2022-03-01 NOTE — HEALTH RISK ASSESSMENT
[Never] : Never [No] : No [No falls in past year] : Patient reported no falls in the past year [0] : 2) Feeling down, depressed, or hopeless: Not at all (0) [PHQ-2 Negative - No further assessment needed] : PHQ-2 Negative - No further assessment needed [[id number]] : 0

## 2022-03-03 ENCOUNTER — RX RENEWAL (OUTPATIENT)
Age: 77
End: 2022-03-03

## 2022-04-06 ENCOUNTER — OUTPATIENT (OUTPATIENT)
Dept: OUTPATIENT SERVICES | Facility: HOSPITAL | Age: 77
LOS: 1 days | Discharge: HOME | End: 2022-04-06

## 2022-04-06 DIAGNOSIS — C50.919 MALIGNANT NEOPLASM OF UNSPECIFIED SITE OF UNSPECIFIED FEMALE BREAST: ICD-10-CM

## 2022-04-06 LAB
25(OH)D3 SERPL-MCNC: 75 NG/ML
ALBUMIN SERPL ELPH-MCNC: 4.8 G/DL
ALP BLD-CCNC: 105 U/L
ALT SERPL-CCNC: 29 U/L
ANION GAP SERPL CALC-SCNC: 14 MMOL/L
AST SERPL-CCNC: 25 U/L
BASOPHILS # BLD AUTO: 0.05 K/UL
BASOPHILS NFR BLD AUTO: 0.8 %
BILIRUB SERPL-MCNC: 0.9 MG/DL
BUN SERPL-MCNC: 11 MG/DL
CALCIUM SERPL-MCNC: 9.9 MG/DL
CHLORIDE SERPL-SCNC: 101 MMOL/L
CHOLEST SERPL-MCNC: 174 MG/DL
CO2 SERPL-SCNC: 28 MMOL/L
CREAT SERPL-MCNC: 0.71 MG/DL
EGFR: 89 ML/MIN/1.73M2
EOSINOPHIL # BLD AUTO: 0.09 K/UL
EOSINOPHIL NFR BLD AUTO: 1.4 %
ESTIMATED AVERAGE GLUCOSE: 171 MG/DL
GLUCOSE SERPL-MCNC: 196 MG/DL
HBA1C MFR BLD HPLC: 7.6 %
HCT VFR BLD CALC: 41.8 %
HDLC SERPL-MCNC: 69 MG/DL
HGB BLD-MCNC: 14.1 G/DL
IMM GRANULOCYTES NFR BLD AUTO: 0.5 %
LDLC SERPL CALC-MCNC: 85 MG/DL
LYMPHOCYTES # BLD AUTO: 1.19 K/UL
LYMPHOCYTES NFR BLD AUTO: 18.3 %
MAN DIFF?: NORMAL
MCHC RBC-ENTMCNC: 30.4 PG
MCHC RBC-ENTMCNC: 33.7 GM/DL
MCV RBC AUTO: 90.1 FL
MONOCYTES # BLD AUTO: 0.38 K/UL
MONOCYTES NFR BLD AUTO: 5.8 %
NEUTROPHILS # BLD AUTO: 4.77 K/UL
NEUTROPHILS NFR BLD AUTO: 73.2 %
NONHDLC SERPL-MCNC: 105 MG/DL
PLATELET # BLD AUTO: 272 K/UL
POTASSIUM SERPL-SCNC: 4.5 MMOL/L
PROT SERPL-MCNC: 6.9 G/DL
RBC # BLD: 4.64 M/UL
RBC # FLD: 12.8 %
SODIUM SERPL-SCNC: 143 MMOL/L
TRIGL SERPL-MCNC: 100 MG/DL
WBC # FLD AUTO: 6.51 K/UL

## 2022-04-12 ENCOUNTER — APPOINTMENT (OUTPATIENT)
Dept: HEMATOLOGY ONCOLOGY | Facility: CLINIC | Age: 77
End: 2022-04-12
Payer: MEDICARE

## 2022-04-12 VITALS
WEIGHT: 119.05 LBS | BODY MASS INDEX: 19.6 KG/M2 | DIASTOLIC BLOOD PRESSURE: 75 MMHG | HEIGHT: 65.35 IN | TEMPERATURE: 97.7 F | HEART RATE: 80 BPM | SYSTOLIC BLOOD PRESSURE: 122 MMHG | OXYGEN SATURATION: 98 % | RESPIRATION RATE: 16 BRPM

## 2022-04-12 PROCEDURE — 99214 OFFICE O/P EST MOD 30 MIN: CPT

## 2022-04-21 ENCOUNTER — APPOINTMENT (OUTPATIENT)
Dept: INTERNAL MEDICINE | Facility: CLINIC | Age: 77
End: 2022-04-21
Payer: MEDICARE

## 2022-04-21 VITALS
OXYGEN SATURATION: 99 % | BODY MASS INDEX: 20.16 KG/M2 | WEIGHT: 121 LBS | HEART RATE: 79 BPM | HEIGHT: 65 IN | SYSTOLIC BLOOD PRESSURE: 114 MMHG | TEMPERATURE: 98 F | RESPIRATION RATE: 16 BRPM | DIASTOLIC BLOOD PRESSURE: 68 MMHG

## 2022-04-21 PROCEDURE — 99215 OFFICE O/P EST HI 40 MIN: CPT

## 2022-04-25 NOTE — HISTORY OF PRESENT ILLNESS
[Disease: _____________________] : Disease: [unfilled] [T: ___] : T[unfilled] [N: ___] : N[unfilled] [M: ___] : M[unfilled] [AJCC Stage: ____] : AJCC Stage: [unfilled] [Treatment Protocol] : Treatment Protocol [de-identified] : The patient presented in 1997, at age 52, with a mass in the right breast she discovered on breast self-examination.\par \par Dr. Tessy Bender performed an excisional biopsy on September 22, 1997 which demonstrated a 1.2 cm well differentiated infiltrating ductal carcinoma which was ER positive (25%), [place] positive (50%) and CerbB2 positive in 30% of the cells. There was a 10-20% component of DCIS.\par \par On October 13, 1997 the patient underwent right axillary lymph node dissection which demonstrated 24 negative axillary lymph nodes.\par \par Postoperatively the patient received adjuvant IV CMF extending from November 2, 1997 through April 27, 1998.\par \par The patient subsequently received radiation therapy at [facility] ([facility]) under the supervision of Dr. Rick Mendosa. 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 [de-identified] : well-differentiated infiltrating ductal carcinoma ER positive [place] positive  [FreeTextEntry1] : On observation. [de-identified] : \par On observation\par last seen in 11/2021 for two week history of diffuse right breast pain and one episode of right nipple dc seen on bra; currently denies any further episodes of pain or discharge\par R Mammo/Sono 11/2021 reportedly normal, will get report\par MRI Breast 11/11/21 no evidence of malignancy BIRADS1; benign skin lesion RLOQ 7mm\par Saw Dr Bender's associate in 11/2021 who did not feel there was any acute abnormality.\par Labs today\par \par HEALTH MAINTENANCE\par Last saw PCP Dr Aamir Chicas 2/23/22, metformin frequency was increased to BID. Retiring, would like to follow with Dr. Swanson.\par Last eye exam late 2020, with benign findings. Beginnings of cataract.   is ophtho\par CARDS Dr. Misael Garcia - calcium score elevated, nuclear stress test recommended, pt debating\par ENDO Dr. Juana Swanson - osteopenia, DEXA 1/4/2021 - osteopenia in spine -1.6, femoral neck -2.1, total hip -2.2.  On calcium/vit D.  On atorvastatin 40mg daily;\par Last colonoscopy 08/18/2020, no polyps found. Due in 3 yrs. Family hx of colon cancer. \par Most recent appointment with GYN Dr Mtz, going to see 9/2021. Denies vaginal spotting/bleeding.\par Most recent dermatology evaluation 1/2021. Four joanne's procedure for precancerous lesions. Patient states she is up-to-date..\par Father was an physician, into preventive medicine;  physician\par Active - cardio and weight lifting \par COVID vaccine Pfizer 3/2021 and booster 9/21

## 2022-04-25 NOTE — PHYSICAL EXAM
[Fully active, able to carry on all pre-disease performance without restriction] : Status 0 - Fully active, able to carry on all pre-disease performance without restriction [Thin] : thin [Normal] : affect appropriate [de-identified] : Right breast: scars UIQ and axilla, fibrocystic changes UOQ,  no mass. Left breast: scar UIQ,fibrocystic changes, no mass; dense breasts b/l; no nipple discharge illicted; no adenopathy bilateral; no erythema or swelling of the breast.

## 2022-04-25 NOTE — ASSESSMENT
[FreeTextEntry1] : Stage I infiltrating ductal carcinoma right breast 1997\par     Status post lumpectomy/ALND\par     Status post CMF\par     Status post RT\par     Status post tamoxifen x5 years\par     Status post letrozole x5 years.\par     Up to date with imaging; due for yearly surveillance, order in [employer]\par     Clinically [place]\par     Labs today\par     FU in 6mos

## 2022-04-27 NOTE — REVIEW OF SYSTEMS
[Nocturia] : nocturia [Joint Stiffness] : joint stiffness [Negative] : Heme/Lymph [Fever] : no fever [Chills] : no chills [Fatigue] : no fatigue [Hot Flashes] : no hot flashes [Night Sweats] : no night sweats [Recent Change In Weight] : ~T no recent weight change [Discharge] : no discharge [Pain] : no pain [Redness] : no redness [Dryness] : no dryness  [Vision Problems] : no vision problems [Itching] : no itching [Earache] : no earache [Hearing Loss] : no hearing loss [Nosebleed] : no nosebleeds [Hoarseness] : no hoarseness [Nasal Discharge] : no nasal discharge [Sore Throat] : no sore throat [Postnasal Drip] : no postnasal drip [Chest Pain] : no chest pain [Palpitations] : no palpitations [Leg Claudication] : no leg claudication [Lower Ext Edema] : no lower extremity edema [Orthopnea] : no orthopnea [Paroxysmal Nocturnal Dyspnea] : no paroxysmal nocturnal dyspnea [Shortness Of Breath] : no shortness of breath [Wheezing] : no wheezing [Cough] : no cough [Dyspnea on Exertion] : no dyspnea on exertion [Abdominal Pain] : no abdominal pain [Nausea] : no nausea [Constipation] : no constipation [Diarrhea] : diarrhea [Vomiting] : no vomiting [Heartburn] : no heartburn [Melena] : no melena [Dysuria] : no dysuria [Incontinence] : no incontinence [Poor Libido] : libido not poor [Hematuria] : no hematuria [Frequency] : no frequency [Vaginal Discharge] : no vaginal discharge [Dysmenorrhea] : no dysmenorrhea [Joint Pain] : no joint pain [Joint Swelling] : no joint swelling [Muscle Weakness] : no muscle weakness [Muscle Pain] : no muscle pain [Back Pain] : no back pain [Itching] : no itching [Mole Changes] : no mole changes [Nail Changes] : no nail changes [Hair Changes] : no hair changes [Skin Rash] : no skin rash [Headache] : no headache [Dizziness] : no dizziness [Fainting] : no fainting [Memory Loss] : no memory loss [Unsteady Walking] : no ataxia [Suicidal] : not suicidal [Insomnia] : no insomnia [Anxiety] : no anxiety [Depression] : no depression [Easy Bleeding] : no easy bleeding [Easy Bruising] : no easy bruising [Swollen Glands] : no swollen glands

## 2022-04-27 NOTE — PHYSICAL EXAM
[No Acute Distress] : no acute distress [Well Nourished] : well nourished [Well Developed] : well developed [Well-Appearing] : well-appearing [Normal Voice/Communication] : normal voice/communication [Normal Sclera/Conjunctiva] : normal sclera/conjunctiva [PERRL] : pupils equal round and reactive to light [EOMI] : extraocular movements intact [Normal Outer Ear/Nose] : the outer ears and nose were normal in appearance [Normal Oropharynx] : the oropharynx was normal [Normal TMs] : both tympanic membranes were normal [Normal Nasal Mucosa] : the nasal mucosa was normal [No JVD] : no jugular venous distention [No Lymphadenopathy] : no lymphadenopathy [Supple] : supple [Thyroid Normal, No Nodules] : the thyroid was normal and there were no nodules present [No Respiratory Distress] : no respiratory distress  [No Accessory Muscle Use] : no accessory muscle use [Clear to Auscultation] : lungs were clear to auscultation bilaterally [Normal Rate] : normal rate  [Regular Rhythm] : with a regular rhythm [Normal S1, S2] : normal S1 and S2 [No Murmur] : no murmur heard [No Carotid Bruits] : no carotid bruits [No Abdominal Bruit] : a ~M bruit was not heard ~T in the abdomen [No Varicosities] : no varicosities [Pedal Pulses Present] : the pedal pulses are present [No Edema] : there was no peripheral edema [No Palpable Aorta] : no palpable aorta [No Extremity Clubbing/Cyanosis] : no extremity clubbing/cyanosis [Declined Breast Exam] : declined breast exam  [Soft] : abdomen soft [Non Tender] : non-tender [Non-distended] : non-distended [No Masses] : no abdominal mass palpated [No HSM] : no HSM [Normal Bowel Sounds] : normal bowel sounds [Declined Rectal Exam] : declined rectal exam [Normal Supraclavicular Nodes] : no supraclavicular lymphadenopathy [Normal Axillary Nodes] : no axillary lymphadenopathy [Normal Posterior Cervical Nodes] : no posterior cervical lymphadenopathy [Normal Anterior Cervical Nodes] : no anterior cervical lymphadenopathy [Normal Inguinal Nodes] : no inguinal lymphadenopathy [Normal Femoral Nodes] : no femoral lymphadenopathy [No CVA Tenderness] : no CVA  tenderness [No Spinal Tenderness] : no spinal tenderness [No Joint Swelling] : no joint swelling [Grossly Normal Strength/Tone] : grossly normal strength/tone [No Rash] : no rash [No Skin Lesions] : no skin lesions [Coordination Grossly Intact] : coordination grossly intact [No Focal Deficits] : no focal deficits [Normal Gait] : normal gait [Deep Tendon Reflexes (DTR)] : deep tendon reflexes were 2+ and symmetric [Speech Grossly Normal] : speech grossly normal [Memory Grossly Normal] : memory grossly normal [Normal Affect] : the affect was normal [Alert and Oriented x3] : oriented to person, place, and time [Normal Mood] : the mood was normal [Normal Insight/Judgement] : insight and judgment were intact [Kyphosis] : no kyphosis [Scoliosis] : no scoliosis [Acne] : no acne

## 2022-04-27 NOTE — ASSESSMENT
[FreeTextEntry1] : Physical examination shows a well-developed woman in no acute distress blood pressure 114/68 height 5 foot 5 inches weight 121 pounds BMI 20.14 temperature 98 °F heart rate of 79 respirations 16 HEENT was unremarkable chest was clear cardiovascular exam was regular abdomen was soft and nontender extremities showed no clubbing cyanosis or edema neurologic exam was nonfocal patient's recent blood tests showed a hemoglobin A1c of 7.6% metformin will be increased to 1000 mg in the morning and 500 in the evening continued carbohydrate restriction is advised we will repeat the A1c in 4 to 6 months vitamin D level was for 75 cholesterol 174 HDL 69 LDL of 85 patient follows regularly with her oncologist concerning her breast Cancer and has appropriate imaging she also follows with Dr. Quita Garcia her cardiologist patient's last bone density was in January 2021 she will be scheduling a mammogram and breast ultrasound shortly she has had an MRI of the breast in November 2021 last colonoscopy was in August 2020 patient had a nuclear stress test in October 2021

## 2022-04-27 NOTE — HISTORY OF PRESENT ILLNESS
[Spouse] : spouse [FreeTextEntry1] : 77-year-old woman comes to the office for follow-up accompanied by her  to review her medications and discuss her overall health and also to review recently performed blood tests patient's only complaint is that of nocturia [de-identified] : Comes to the office for follow-up with a history of adenocarcinoma of her breast stage I diabetes type 2 elevated cholesterol osteopenia and vitamin D deficiency she denies temperature chills sweats or myalgias she has had no headache sinus congestion sore throat cough wheezing pleurisy chest pain shortness of breath exertional dyspnea lightheadedness palpitations dizziness vertigo or syncope she denies abdominal pain nausea vomiting diarrhea constipation bright red blood per rectum or black stools her appetite has been good her weight has been stable she does get up at night to urinate but denies dysuria or gross hematuria patient denies significant musculoskeletal complaints leg edema skin rashes has no severe bouts of anxiety and depression and has been sleeping adequately recently

## 2022-04-28 ENCOUNTER — APPOINTMENT (OUTPATIENT)
Dept: INTERNAL MEDICINE | Facility: CLINIC | Age: 77
End: 2022-04-28
Payer: MEDICARE

## 2022-04-28 VITALS
RESPIRATION RATE: 12 BRPM | OXYGEN SATURATION: 99 % | SYSTOLIC BLOOD PRESSURE: 108 MMHG | HEART RATE: 78 BPM | HEIGHT: 65 IN | WEIGHT: 121 LBS | BODY MASS INDEX: 20.16 KG/M2 | TEMPERATURE: 97.8 F | DIASTOLIC BLOOD PRESSURE: 56 MMHG

## 2022-04-28 PROCEDURE — 99215 OFFICE O/P EST HI 40 MIN: CPT

## 2022-05-03 NOTE — HEALTH RISK ASSESSMENT
[Never] : Never [No] : No [No falls in past year] : Patient reported no falls in the past year [0] : 2) Feeling down, depressed, or hopeless: Not at all (0) [PHQ-2 Negative - No further assessment needed] : PHQ-2 Negative - No further assessment needed [[id number]] : 0

## 2022-05-03 NOTE — REVIEW OF SYSTEMS
[Fever] : no fever [Chills] : no chills [Fatigue] : no fatigue [Hot Flashes] : no hot flashes [Night Sweats] : no night sweats [Recent Change In Weight] : ~T no recent weight change [Discharge] : no discharge [Pain] : no pain [Redness] : no redness [Dryness] : no dryness  [Vision Problems] : no vision problems [Itching] : no itching [Earache] : no earache [Hearing Loss] : no hearing loss [Nosebleed] : no nosebleeds [Hoarseness] : no hoarseness [Nasal Discharge] : no nasal discharge [Sore Throat] : no sore throat [Postnasal Drip] : no postnasal drip [Chest Pain] : no chest pain [Palpitations] : no palpitations [Leg Claudication] : no leg claudication [Lower Ext Edema] : no lower extremity edema [Orthopnea] : no orthopnea [Paroxysmal Nocturnal Dyspnea] : no paroxysmal nocturnal dyspnea [Shortness Of Breath] : no shortness of breath [Wheezing] : no wheezing [Cough] : no cough [Dyspnea on Exertion] : no dyspnea on exertion [Abdominal Pain] : no abdominal pain [Nausea] : no nausea [Constipation] : no constipation [Diarrhea] : diarrhea [Vomiting] : no vomiting [Heartburn] : no heartburn [Melena] : no melena [Dysuria] : no dysuria [Incontinence] : no incontinence [Nocturia] : nocturia [Poor Libido] : libido not poor [Hematuria] : no hematuria [Frequency] : no frequency [Vaginal Discharge] : no vaginal discharge [Dysmenorrhea] : no dysmenorrhea [Joint Pain] : no joint pain [Joint Stiffness] : joint stiffness [Joint Swelling] : no joint swelling [Muscle Weakness] : no muscle weakness [Muscle Pain] : no muscle pain [Back Pain] : no back pain [Itching] : no itching [Mole Changes] : no mole changes [Nail Changes] : no nail changes [Hair Changes] : no hair changes [Skin Rash] : skin rash [Headache] : no headache [Dizziness] : no dizziness [Fainting] : no fainting [Memory Loss] : no memory loss [Unsteady Walking] : no ataxia [Suicidal] : not suicidal [Insomnia] : no insomnia [Anxiety] : no anxiety [Depression] : no depression [Easy Bleeding] : no easy bleeding [Easy Bruising] : no easy bruising [Swollen Glands] : no swollen glands [Negative] : Heme/Lymph [de-identified] : diffuse maculopapular rash

## 2022-05-03 NOTE — ASSESSMENT
[FreeTextEntry1] : Physical examination shows a well-developed woman in no acute distress blood pressure 108/56 height 5 foot 5 inches weight 121 pounds BMI 20.14 temperature 97.8 °F heart rate of 78 respirations 16 HEENT was unremarkable chest was clear cardiovascular exam was regular with no extra heart sounds or murmurs abdomen was soft and nontender extremities showed no clubbing cyanosis or edema and neurologic exam was nonfocal skin covered by a maculopapular rash compatible with an allergic reaction patient recently increasing her metformin which is unlikely to contribute but she is deferring on taking it patient takes multiple vitamins and was also asked to stop this Januvia 100 mg was added in place of the metformin patient has no obesity if the rash does not go away she will contact a dermatologist she will take Benadryl at present she is followed by her cardiologist Dr. Garcia and oncologist Dr. Walsh patient had complete blood test April 5, 2022 which had been significant for hemoglobin A1c of 7.6% patient will bring in her card with dates of her COVID-19 vaccinations

## 2022-05-03 NOTE — HISTORY OF PRESENT ILLNESS
[FreeTextEntry1] : 77-year-old woman comes to the office for follow-up to review her medications and discuss her overall health patient recently noting diffuse pruritic maculopapular rash [de-identified] : Comes to the office for follow-up with a history of type 2 diabetes breast cancer elevated cholesterol osteopenia vitamin D deficiency recently increased her metformin to 2000 mg in the morning and 500 in the evening patient also takes multiple vitamins recently over the past several days she has broken out in a diffuse maculopapular rash rashes itching and not painful she denies temperature chills sweats or myalgias she has had no headache sinus congestion sore throat cough wheezing pleurisy chest pain shortness of breath exertional dyspnea lightheadedness palpitations dizziness vertigo or syncope she has had no abdominal pain nausea vomiting diarrhea constipation bright red blood per rectum or black stools appetite has been good weight has been stable does get up at night to urinate but denies dysuria or gross hematuria has had no leg edema denies significant anxiety and depression and has been sleeping adequately

## 2022-05-13 RX ORDER — SITAGLIPTIN 100 MG/1
100 TABLET, FILM COATED ORAL
Qty: 30 | Refills: 5 | Status: DISCONTINUED | COMMUNITY
Start: 2022-04-28 | End: 2022-05-13

## 2022-05-19 ENCOUNTER — RX RENEWAL (OUTPATIENT)
Age: 77
End: 2022-05-19

## 2022-08-11 ENCOUNTER — APPOINTMENT (OUTPATIENT)
Dept: ORTHOPEDIC SURGERY | Facility: CLINIC | Age: 77
End: 2022-08-11

## 2022-08-11 PROCEDURE — 73610 X-RAY EXAM OF ANKLE: CPT | Mod: RT

## 2022-08-11 PROCEDURE — 99203 OFFICE O/P NEW LOW 30 MIN: CPT

## 2022-08-12 NOTE — HISTORY OF PRESENT ILLNESS
[de-identified] : 77 year old female presents today with right ankle pain x 2 weeks. States that she was caught in the rain and attempted to run and did it with out issue. She developed pain and swelling 1 week later. Her  is a retired physician and put patient on prednisone. Medication has been helpful with swelling but she continues to have some residual pain. The pain is brought on with walking. C/o weakness in the ankle. \par \par The patient's past medical history, past surgical history, medications and allergies were reviewed by me today with the patient and documented accordingly. In addition, the patient's family and social history, which were noncontributory to this visit, were reviewed also.

## 2022-08-12 NOTE — DISCUSSION/SUMMARY
[de-identified] : 76 y/o female with right ankle Peroneal tendinitis\par \par The patient presents today with radiating right lateral ankle pain with local swelling and discomfort over the peroneal tendons.  Given the patient's presentation, this is likely due to peroneal tendinitis, the mechanism of which is multifactorial. This involves tendon irritation and overuse to the lateral ankle. Treatment include supportive shoe wear, NSAIDs/Ice prn, activity modifcation. \par \par This was discussed in detail with the patient, and all questions were answered. \par \par Recommendations: Supportive shoewear. NSAIDs/Ice prn. Activity to tolerance.\par \par Follow-up as needed.

## 2022-08-12 NOTE — PHYSICAL EXAM
[de-identified] : Oriented to time, place, person\par Mood: Normal\par Affect: Normal\par Appearance: Healthy, well appearing, no acute distress.\par Gait: Normal\par Assistive Devices: None\par \par Right Ankle exam:\par \par Skin: Clean, dry, intact\par Inspection: No obvious malalignment, + edema inferior lateral ankle. \par Pulses: 2+ DP/PT pulses \par ROM: +ttp peroneal tendon. normal degrees of dorsiflexion, normal degrees of plantarflexion, normal subtalar motion.\par Tenderness: No tenderness over the lateral malleolus, no CFL/ATFL/PTFL pain. No medial malleolus pain, no deltoid ligament pain. No proximal fibular pain. No heel pain. \par Stability: Negative anterior drawer, negative posterior drawer.\par Strength: 5/5 TA/GS/EHL 5/5 inversion/eversion, pain with eversion\par Neuro: In tact to light touch throughout\par Additional tests: Negative syndesmosis squeeze test.  [de-identified] : The following radiographs were ordered and read by me during this patients visit. I reviewed each radiograph in detail with the patient and discussed the findings as highlighted below. \par \par 3 views of the right ankle were obtained today, 08/11/2022, that show no acute fracture or dislocation. There is no significant degenerative change seen. There is no gross malalignment. Ankle mortise is intact. No significant other obvious acute osseous abnormality, otherwise unremarkable.

## 2022-08-12 NOTE — ADDENDUM
[FreeTextEntry1] : This note was written by Doris Pineda on 08/11/2022 acting solely as a scribe for Dr. Pola hCeng.\par \par All medical record entries made by the Scribe were at my, Dr. Pola Cheng, direction and personally dictated by me on 08/11/2022. I have personally reviewed the chart and agree that the record accurately reflects my personal performance of the history, physical exam, assessment and plan.

## 2022-09-12 ENCOUNTER — APPOINTMENT (OUTPATIENT)
Dept: INTERNAL MEDICINE | Facility: CLINIC | Age: 77
End: 2022-09-12

## 2022-09-12 VITALS
OXYGEN SATURATION: 96 % | RESPIRATION RATE: 15 BRPM | HEIGHT: 65 IN | SYSTOLIC BLOOD PRESSURE: 118 MMHG | DIASTOLIC BLOOD PRESSURE: 68 MMHG | BODY MASS INDEX: 19.83 KG/M2 | TEMPERATURE: 98.1 F | HEART RATE: 67 BPM | WEIGHT: 119 LBS

## 2022-09-12 VITALS
SYSTOLIC BLOOD PRESSURE: 118 MMHG | BODY MASS INDEX: 19.83 KG/M2 | OXYGEN SATURATION: 96 % | WEIGHT: 119 LBS | RESPIRATION RATE: 15 BRPM | DIASTOLIC BLOOD PRESSURE: 68 MMHG | TEMPERATURE: 98.1 F | HEIGHT: 65 IN | HEART RATE: 67 BPM

## 2022-09-12 PROCEDURE — 99215 OFFICE O/P EST HI 40 MIN: CPT

## 2022-09-14 ENCOUNTER — APPOINTMENT (OUTPATIENT)
Dept: ENDOCRINOLOGY | Facility: CLINIC | Age: 77
End: 2022-09-14

## 2022-09-14 VITALS
HEART RATE: 81 BPM | DIASTOLIC BLOOD PRESSURE: 70 MMHG | OXYGEN SATURATION: 98 % | SYSTOLIC BLOOD PRESSURE: 120 MMHG | BODY MASS INDEX: 19.83 KG/M2 | WEIGHT: 119 LBS | HEIGHT: 65 IN | TEMPERATURE: 97.3 F

## 2022-09-14 PROCEDURE — 99203 OFFICE O/P NEW LOW 30 MIN: CPT

## 2022-09-14 NOTE — ASSESSMENT
[FreeTextEntry1] : Targets in patients 65 years and older: HbA1c 7 to 8%, BP < 140/90\par \par HbA1c is at goal - in May 2022, the patient was off metformin and on prednisone as she had a rash that she thought was due to metformin - the rash resolved and her metformin was gradually reintroduced and the rash did not recur.\par BP is at goal.\par Patient is on statin - no indication for Aspirin or ACEi/ARB. \par \par The patient has osteopenia with an elevated FRAX score but she does not want to use treatment for this. Patient says she performs weight lifting exercises to improve her BMD.\par \par Plan:\par 1. Continue current treatment regimen\par 2. Fingersticks to be done once daily\par 3. Labs to be done in 3 months - see below\par 4. Follow up in 3 months to review results and meter

## 2022-09-14 NOTE — HISTORY OF PRESENT ILLNESS
[FreeTextEntry1] : Problems:\par 1. DM type 2\par 2. Hyperlipidemia\par 3. Osteopenia\par \par DM type 2\par 1. Diagnosed in 2012\par 2. Meds:\par Metformin 1000 mg po am and 500 mg po pm (no side effects) \par 3. Fingersticks not done, no hypoglycemic unawareness\par 4. Not on Aspirin, on atorvastatin 40 mg po daily, not ACEi/ARB\par 5. Complications:\par No DM nephropathy (normal creatinine)\par No DM retinopathy (last eye exam was in April 2022, patient advised on the need for annual DM eye exam)\par No ASCVD\par No foot ulcers/amputation\par 6. Patient never smoked cigarettes \par \par Osteopenia\par 1. Diagnosed in 2012\par 2. Radiology:\par 01/04/2021 - DXA - Left femoral neck BMD 0.612 with T score neg 2.1, L1 to L4 0.871 with T score neg 1.6, Total L hip BMD 0.672 with T score of neg 2.2 \par FRAX score - major osteoporotic fracture 22%, Hip fracture 14%\par 3. Labs:\par September 2022 - corrected calcium normal, Cr normal, TSH normal\par 4. Patient never had a fracture\par 5. Father fractured his hip when he was in his 80s.\par 6. Calcium and vitamin D supplements:\par Vitamin D daily - dose unknown\par Patient intakes 600 to 800 mg of calcium from her diet (she uses one cup of kefir daily and one slice of cheese daily).

## 2022-09-14 NOTE — PHYSICAL EXAM
[de-identified] : General: No distress, well nourished\par Eyes: Normal Sclera, EOMI, PERRL\par ENT: Normal appearance of the nose, normal oropharynx\par Neck/Thyroid: No cervical lymphadenopathy, thyroid gland 20 g in size, no thyroid nodules, non-tender\par Respiratory: No use of accessory muscles of respiration, vesicular breath sounds heard bilaterally, no crepitations or ronchi\par Cardiovascular: S1 and S2 heard and normal, no S3 or S4, no murmurs, radial pulse normal bilaterally\par Abdomen: soft, non-tender, no masses, normal bowel sounds\par Musculoskeletal: No swelling or deformities of joints of hands, no pedal edema\par Neurological: Normal range of motion in the hands, Normal brachioradialis reflexes bilaterally\par Psychiatry: Patient converses normally, good judgement and insight\par Skin: No rashes in hands, no nodules palpated in hands  [de-identified] : DM foot exam done on 09/14/2022:\par No ulcers seen in feet\par Dorsalis pedis pulses normal bilaterally\par Sensation to 10 g monofilament normal in feet bilaterally

## 2022-09-21 NOTE — HISTORY OF PRESENT ILLNESS
[Spouse] : spouse [FreeTextEntry1] : 77-year-old woman comes to the office for follow-up t o review her medications and discuss her overall health recent issues with some joint stiffness [de-identified] : Comes to the office for follow-up with a history of elevated cholesterol invasive ductal carcinoma of the breast on the right type 2 diabetes vitamin D deficiency and osteopenia review of systems is significant for nocturia joint stiffness review of systems is otherwise noncontributory

## 2022-09-21 NOTE — REVIEW OF SYSTEMS
[Fever] : no fever [Chills] : no chills [Fatigue] : no fatigue [Hot Flashes] : no hot flashes [Night Sweats] : no night sweats [Recent Change In Weight] : ~T no recent weight change [Discharge] : no discharge [Pain] : no pain [Redness] : no redness [Dryness] : no dryness  [Vision Problems] : no vision problems [Itching] : no itching [Earache] : no earache [Hearing Loss] : no hearing loss [Nosebleed] : no nosebleeds [Hoarseness] : no hoarseness [Nasal Discharge] : no nasal discharge [Sore Throat] : no sore throat [Postnasal Drip] : no postnasal drip [Chest Pain] : no chest pain [Palpitations] : no palpitations [Leg Claudication] : no leg claudication [Lower Ext Edema] : no lower extremity edema [Orthopnea] : no orthopnea [Paroxysmal Nocturnal Dyspnea] : no paroxysmal nocturnal dyspnea [Shortness Of Breath] : no shortness of breath [Wheezing] : no wheezing [Cough] : no cough [Dyspnea on Exertion] : no dyspnea on exertion [Abdominal Pain] : no abdominal pain [Nausea] : no nausea [Constipation] : no constipation [Diarrhea] : diarrhea [Vomiting] : no vomiting [Heartburn] : no heartburn [Melena] : no melena [Dysuria] : no dysuria [Incontinence] : no incontinence [Nocturia] : nocturia [Poor Libido] : libido not poor [Hematuria] : no hematuria [Frequency] : no frequency [Vaginal Discharge] : no vaginal discharge [Dysmenorrhea] : no dysmenorrhea [Joint Pain] : no joint pain [Joint Stiffness] : joint stiffness [Joint Swelling] : no joint swelling [Muscle Weakness] : no muscle weakness [Muscle Pain] : no muscle pain [Back Pain] : no back pain [Itching] : no itching [Mole Changes] : no mole changes [Nail Changes] : no nail changes [Hair Changes] : no hair changes [Skin Rash] : no skin rash [Headache] : no headache [Dizziness] : no dizziness [Fainting] : no fainting [Memory Loss] : no memory loss [Unsteady Walking] : no ataxia [Suicidal] : not suicidal [Insomnia] : no insomnia [Anxiety] : no anxiety [Depression] : no depression [Easy Bleeding] : no easy bleeding [Easy Bruising] : no easy bruising [Swollen Glands] : no swollen glands [Negative] : Heme/Lymph

## 2022-09-21 NOTE — HEALTH RISK ASSESSMENT
[Never] : Never [No] : No [No falls in past year] : Patient reported no falls in the past year [0] : 2) Feeling down, depressed, or hopeless: Not at all (0) [PHQ-2 Negative - No further assessment needed] : PHQ-2 Negative - No further assessment needed [[id number]] : 0

## 2022-09-21 NOTE — ASSESSMENT
[FreeTextEntry1] : Physical examination shows a well-developed woman in no acute distress blood pressure 118/68 height 5 feet 5 inches weight 119 pounds BMI 19.8 temperature 98.1 °F heart rate is 67 respirations 15 oxygen saturation on room air is 96% HEENT was unremarkable chest was clear cardiovascular exam was regular with no extra heart sounds or murmurs abdomen was soft and nontender extremities showed no clubbing cyanosis or edema neurologic exam was nonfocal patient has had 3 COVID-19 vaccines the Shingrix vaccines Prevnar 13 and will be considering the influenza vaccine she is up-to-date with her ophthalmologist and dermatologist patient had complete blood test recently which were significant for hemoglobin A1c of 7.9% patient's metformin was increased to 3 tablets a day 500 mg was given the name of an endocrinologist to follow back with cholesterol was 133 HDL was 58 LDL was 60 bilirubin of 1.5 vitamin B12 was 295 supplementation was advised vitamin D was 64.8 patient follows closely with her oncologist Dr. Bibiana Pereira and cardiologist Dr. Misael Garcia she had her last mammography in November 2021 bone density in January 2021 patient also had an MRI of her breast in November 2021 patient's last colonoscopy was in August 2020 by Dr. Aamir Ha

## 2022-09-29 LAB
25(OH)D3 SERPL-MCNC: 64.8 NG/ML
ALBUMIN SERPL ELPH-MCNC: 4.6 G/DL
ALP BLD-CCNC: 92 U/L
ALT SERPL-CCNC: 20 U/L
ANION GAP SERPL CALC-SCNC: 15 MMOL/L
APPEARANCE: CLEAR
AST SERPL-CCNC: 22 U/L
BASOPHILS # BLD AUTO: 0.05 K/UL
BASOPHILS NFR BLD AUTO: 1 %
BILIRUB SERPL-MCNC: 1.5 MG/DL
BILIRUBIN URINE: NEGATIVE
BLOOD URINE: NEGATIVE
BUN SERPL-MCNC: 8 MG/DL
CALCIUM SERPL-MCNC: 9.7 MG/DL
CHLORIDE SERPL-SCNC: 101 MMOL/L
CHOLEST SERPL-MCNC: 133 MG/DL
CO2 SERPL-SCNC: 29 MMOL/L
COLOR: YELLOW
COVID-19 NUCLEOCAPSID  GAM ANTIBODY INTERPRETATION: NEGATIVE
COVID-19 SPIKE DOMAIN ANTIBODY INTERPRETATION: POSITIVE
CREAT SERPL-MCNC: 0.65 MG/DL
CRP SERPL HS-MCNC: 0.24 MG/L
EGFR: 92 ML/MIN/1.73M2
EOSINOPHIL # BLD AUTO: 0.11 K/UL
EOSINOPHIL NFR BLD AUTO: 2.2 %
ESTIMATED AVERAGE GLUCOSE: 180 MG/DL
GLUCOSE BS SERPL-MCNC: 127 MG/DL
GLUCOSE QUALITATIVE U: NEGATIVE
GLUCOSE SERPL-MCNC: 112 MG/DL
HBA1C MFR BLD HPLC: 7.9 %
HCT VFR BLD CALC: 42 %
HDLC SERPL-MCNC: 58 MG/DL
HGB BLD-MCNC: 14.4 G/DL
IMM GRANULOCYTES NFR BLD AUTO: 0.2 %
KETONES URINE: NEGATIVE
LDLC SERPL CALC-MCNC: 60 MG/DL
LEUKOCYTE ESTERASE URINE: NEGATIVE
LYMPHOCYTES # BLD AUTO: 1.73 K/UL
LYMPHOCYTES NFR BLD AUTO: 34.9 %
MAN DIFF?: NORMAL
MCHC RBC-ENTMCNC: 30.3 PG
MCHC RBC-ENTMCNC: 34.3 GM/DL
MCV RBC AUTO: 88.4 FL
MONOCYTES # BLD AUTO: 0.41 K/UL
MONOCYTES NFR BLD AUTO: 8.3 %
NEUTROPHILS # BLD AUTO: 2.65 K/UL
NEUTROPHILS NFR BLD AUTO: 53.4 %
NITRITE URINE: NEGATIVE
NONHDLC SERPL-MCNC: 75 MG/DL
PH URINE: 8
PLATELET # BLD AUTO: 294 K/UL
POTASSIUM SERPL-SCNC: 4.2 MMOL/L
PROT SERPL-MCNC: 6.4 G/DL
PROTEIN URINE: NORMAL
RBC # BLD: 4.75 M/UL
RBC # FLD: 13.2 %
SARS-COV-2 AB SERPL IA-ACNC: >250 U/ML
SARS-COV-2 AB SERPL QL IA: 0.08 INDEX
SODIUM SERPL-SCNC: 145 MMOL/L
SPECIFIC GRAVITY URINE: 1.02
T4 FREE SERPL-MCNC: 1.1 NG/DL
TRIGL SERPL-MCNC: 71 MG/DL
TSH SERPL-ACNC: 0.46 UIU/ML
UROBILINOGEN URINE: NORMAL
VIT B12 SERPL-MCNC: 295 PG/ML
WBC # FLD AUTO: 4.96 K/UL

## 2022-10-06 ENCOUNTER — NON-APPOINTMENT (OUTPATIENT)
Age: 77
End: 2022-10-06

## 2022-10-07 ENCOUNTER — APPOINTMENT (OUTPATIENT)
Dept: FAMILY MEDICINE | Facility: CLINIC | Age: 77
End: 2022-10-07

## 2022-10-07 PROCEDURE — 99214 OFFICE O/P EST MOD 30 MIN: CPT

## 2022-10-07 RX ORDER — METFORMIN HYDROCHLORIDE 500 MG/1
500 TABLET, COATED ORAL
Qty: 360 | Refills: 0 | Status: ACTIVE | COMMUNITY
Start: 2022-09-12

## 2022-10-07 NOTE — HISTORY OF PRESENT ILLNESS
[FreeTextEntry1] : HERE TODAY FOR INITIAL APPT FOR TYPE 2 DIABETES\par REASONABLY CONTROLLED TYPE 2 DIABETES x 7 YEARS\par A1C 7.9% UP FROM 7.6% IN APRIL 2022 AND 6.3% FEB 2021\par FEELS WELL\par DENIES POLYURIA, POLYDIPSIA OR UNINTENTIONAL WEIGHT LOSS \par .REPORTS COMPLIANCE W/ METFORMIN 1,000 MG WITH BREAKFAST  MG AT BEDTIME \par BMI 19.8\par EATS BALANCED  DIET.  EATS FISH AND TOFU FOR PROTEIN, FRUIT AND VEGETABLES.  DRINKS MOSTLY WATER - NO JUICE OR SUGARY BEVERAGES\par TESTING BG A FEW TIMES PER WEEK.   FASTING READINGS AROUND 180.  AFTERNOON 125-170\par VERY PHYSICALLY ACTIVE - PILATES, BALLET, WALKING \par LIVES WITH SPOUSE WHO IS A RETIRED OPHTHALMOLOGIST\par PT DOES NOT HAVE A PRESCRIPTION DRUG PLAN AND NEEDS TO KEEP OUT OF POCKET EXPENSES TO A MINIMUM\par SPOUSE HAS BEEN SENDING PRESCRIPTION TO "[facility]" [facility]LINE [facility].  HAS ADEQUATE SUPPLY IN HOME\par UTD WITH OPTHO.  SEE RETINAL SPECIALIST FOR RETINAL ISSUE - NOT RETINOPATHY\par \par \par \par

## 2022-10-07 NOTE — PHYSICAL EXAM
[Alert] : alert [No Acute Distress] : no acute distress [No Respiratory Distress] : no respiratory distress [Normal Gait] : normal gait [Foot Ulcers] : no foot ulcers [Right foot was examined, including] : right foot ~C was examined, including visual inspection with sensory and pulse exams [Left foot was examined, including] : left foot ~C was examined, including visual inspection with sensory and pulse exams [Delayed in the Right Toes] : normal in the toes [Normal] : normal [Delayed in the Left Toes] : normal in the toes [2+] : 2+ in the dorsalis pedis [de-identified] : DIMINISHED SENSATION IN RIGHT GREAT TOE ONLY.

## 2022-10-07 NOTE — ASSESSMENT
[Diabetes Foot Care] : diabetes foot care [Long Term Vascular Complications] : long term vascular complications of diabetes [Carbohydrate Consistent Diet] : carbohydrate consistent diet [Importance of Diet and Exercise] : importance of diet and exercise to improve glycemic control, achieve weight loss and improve cardiovascular health [Exercise/Effect on Glucose] : exercise/effect on glucose [Self Monitoring of Blood Glucose] : self monitoring of blood glucose [Retinopathy Screening] : Patient was referred to ophthalmology for retinopathy screening [FreeTextEntry1] : DISCUSSED GENERAL A1C GOAL OF 7% AND BG GOALS  BEFORE MEALS AND < 180 2 HOURS AFTER MEALS TO HELP REDUCE INCIDENCE OF COMPLICATIONS \par EDUCATED ON PAIRED TESTING OF BG 2-3 x WEEK.  ENCOURAGED TO TEST BEFORE A MEAL AND THEN 2 HOURS AFTER THAT MEAL TO ASSESS RESPONSE TO THAT MEAL.  ENCOURAGED TO VARY MEALS TESTED AND RECORD. \par NO ADDITIONAL LIFESTYLE RECOMMENDATIONS AS PT IS EATING WELL, WEIGHT IS WITHIN RANGE AND EXERCISING ADEQUATELY\par LENGTHY EDUCATION ON CONSISTENT CARB DIET \par TREATMENT OPTIONS DISCUSSED TO HELP GET A1C CLOSER TO GOAL.  RISKS/BENEFITS AND SIDE EFFECT PROFILE EXPLAINED AND DISCUSSED.  PT OPTED TO INCREASE METFORMIN FROM 1,000 MG WITH BREAKFAST  MG AT BEDTIME TO 1000 MG BID\par WILL TRY FOR 3 MONTHS AND ASSESS EFFECTS\par IF STILL NOT AT GOAL, WOULD CONSIDER DPP4 IF PT OBTAINS PRESCRIPTION COVERAGE\par PT WAS ABLE TO "TEACH BACK" ALL INSTRUCTIONS \par .INSTRUCTED TO CALL OFFICE FOR BG < 70, > 250 OR FOR ANY QUESTIONS OR CONCERNS \par FOLLOW UP WITH PCP AND ENDO\par VM LEFT FOR DR MERINO TO DISCUSS.  AWAITING CALL BACK

## 2022-10-10 ENCOUNTER — OUTPATIENT (OUTPATIENT)
Dept: OUTPATIENT SERVICES | Facility: HOSPITAL | Age: 77
LOS: 1 days | Discharge: HOME | End: 2022-10-10

## 2022-10-10 DIAGNOSIS — C50.919 MALIGNANT NEOPLASM OF UNSPECIFIED SITE OF UNSPECIFIED FEMALE BREAST: ICD-10-CM

## 2022-10-14 ENCOUNTER — APPOINTMENT (OUTPATIENT)
Dept: HEMATOLOGY ONCOLOGY | Facility: CLINIC | Age: 77
End: 2022-10-14

## 2022-10-14 VITALS
TEMPERATURE: 97.7 F | HEART RATE: 77 BPM | RESPIRATION RATE: 16 BRPM | SYSTOLIC BLOOD PRESSURE: 126 MMHG | DIASTOLIC BLOOD PRESSURE: 73 MMHG | WEIGHT: 116.38 LBS | BODY MASS INDEX: 19.39 KG/M2 | OXYGEN SATURATION: 97 % | HEIGHT: 65 IN

## 2022-10-14 LAB
CREAT SPEC-SCNC: 162 MG/DL
MICROALBUMIN 24H UR DL<=1MG/L-MCNC: 2.9 MG/DL
MICROALBUMIN/CREAT 24H UR-RTO: 18 MG/G

## 2022-10-14 PROCEDURE — 99214 OFFICE O/P EST MOD 30 MIN: CPT

## 2022-10-14 NOTE — REVIEW OF SYSTEMS
ASSESSMENT & PLAN:  1. Cellulitis, foot  2. Elevated AST and ALT  3. Acute kidney injury  Wounds appear to be healing well and no signs of cellulitis.  Continue antibiotics as prescribed.  Per patient, the wound care nurse should be contacting them soon to schedule home visit.  Recheck CBC, hepatic profile and BMP today.  - HM1(CBC and Differential)  - Basic Metabolic Panel  - HM1 (CBC with Diff)    2. History of fracture of right ankle  He received 12 oxycodone at hospital discharge and is using 3/day.  He needs a refill.  Refill provided, but he was advised to try to decrease and taper use.  States that prior to hospitalization he was not requiring narcotic regularly for pain control for the ankle fracture.  Further refills from orthopedics and he scheduled to see them on January 4.  - oxyCODONE (ROXICODONE) 5 MG immediate release tablet; Take 1 tablet (5 mg total) by mouth every 6 (six) hours as needed for pain.  Dispense: 12 tablet; Refill: 0    Recommend he schedule an annual exam and establish care visit with PCP in the next month or so.    Medications were reviewed with patient today.    There are no Patient Instructions on file for this visit.    Orders Placed This Encounter   Procedures     Basic Metabolic Panel     Hepatic Profile     HM1 (CBC with Diff)     Medications Discontinued During This Encounter   Medication Reason     oxyCODONE (ROXICODONE) 5 MG immediate release tablet Reorder       Return in about 4 weeks (around 1/24/2019) for Annual physical.    CHIEF COMPLAINT:  Chief Complaint   Patient presents with     Hospital Visit Follow Up     JNs cellulitis 12/18-23, still in a lot of pain       HISTORY OF PRESENT ILLNESS:  Elia is a 29 y.o. male presenting to the clinic today for hospital follow-up.  He was hospitalized 12/18-12/23 at [facility] for cellulitis of the foot and sepsis.  He had a right ankle fracture about a month ago, was in a splint, had some maceration of skin between the toes,  which is felt to be portal of entry.  He was seen by infectious disease during his hospitalization, initially treated with vancomycin and Zosyn, transition to oral Keflex and advised 7 more days after discharge.  Patient also noted to have elevated AST and ALT, thought related to alcohol use and significant Tylenol use prior to admission.  He was on hydrocodone/acetaminophen and additional acetaminophen prior to admission.  All acetaminophen was discontinued, and for pain control he was discharged on oxycodone.  During hospital stay, AST and ALT were trending down, need to be rechecked today.  He also had rising creatinine during his hospitalization, thought possibly related to vancomycin.  Vancomycin was discontinued and he needs recheck of his creatinine today.    Patient has felt well since discharge.  No fevers or chills or malaise.  They are changing dressings once daily as recommended at hospital discharge.  They have not yet had the wound care nurse out to their home due to the holiday, but expect to hear from them soon to set up a visit.    REVIEW OF SYSTEMS:   All other systems are negative.    PFSH:  Morbid obesity    TOBACCO USE:  Social History     Tobacco Use   Smoking Status Never Smoker   Smokeless Tobacco Never Used       VITALS:  Vitals:    12/27/18 0935   BP: 133/81   Patient Site: Left Arm   Patient Position: Sitting   Cuff Size: Adult Large   Pulse: 88   Resp: 16   Temp: 97.5  F (36.4  C)   TempSrc: Oral   SpO2: 96%   Weight: (!) 303 lb (137.4 kg)     Wt Readings from Last 3 Encounters:   12/27/18 (!) 303 lb (137.4 kg)   12/21/18 (!) 302 lb 12.8 oz (137.3 kg)   11/10/18 (!) 280 lb (127 kg)     Body mass index is 43.48 kg/m .    PHYSICAL EXAM:  GENERAL: Pleasant, well-appearing patient in no acute distress.   HEENT: Pupils equal round reactive to light. Sclerae and conjunctivae clear. Oropharynx is clear with moist mucous membranes.   NECK: Supple without lymphadenopathy  CARDIOVASCULAR: Heart  regular rate and rhythm without murmur normal S1-S2   ABDOMEN: Soft, nontender, nondistended.  No guarding or rebound.  No organomegaly or masses appreciated.  LUNGS: Clear to auscultation bilaterally, good air movement throughout   EXTREMITIES Warm and well-perfused without edema.  Foot was removed from the walking boot.  Exam of the skin of the foot reveals healing areas of maceration between the toes.  There is no surrounding erythema.  NEURO: Alert and oriented. Grossly nonfocal.   PSYCHIATRIC: Presents on time and well groomed. Normal speech and thought content. Full affect. No abnormal movements or behaviors noted.        MEDICATIONS:  Current Outpatient Medications   Medication Sig Dispense Refill     aspirin 81 mg chewable tablet Chew 324 mg every evening X 30 days(last day 12/18/18) .       cephalexin (KEFLEX) 500 MG capsule Take 1 capsule (500 mg total) by mouth 4 (four) times a day for 7 days. 28 capsule 0     ibuprofen (ADVIL,MOTRIN) 600 MG tablet Take 1 tablet (600 mg total) by mouth every 8 (eight) hours as needed for pain. 9 tablet 0     oxyCODONE (ROXICODONE) 5 MG immediate release tablet Take 1 tablet (5 mg total) by mouth every 6 (six) hours as needed for pain. 12 tablet 0     No current facility-administered medications for this visit.           [Negative] : Allergic/Immunologic

## 2022-10-17 NOTE — ASSESSMENT
[FreeTextEntry1] : Stage I infiltrating ductal carcinoma right breast 1997\par     Status post lumpectomy/ALND\par     Status post CMF\par     Status post RT\par     Status post tamoxifen x5 years\par     Status post letrozole x5 years.\par     Mammo/Sono due 11/2022\par     BMD due 11/2022\par     Clinically [facility]\par     Labs reviewed 9/2022\par     FU in 6 - 12 months\par \par PMD at least annually with lipid checks/bloodwork, gyn at least annually and PAP test screening per their recommendations, colon cancer screening/colonoscopy at least every 10 years as recommended by PMD/GI , dermatology for annual skin checks  ophthalmology for annual eye exams\par

## 2022-10-17 NOTE — HISTORY OF PRESENT ILLNESS
[Disease: _____________________] : Disease: [unfilled] [T: ___] : T[unfilled] [N: ___] : N[unfilled] [M: ___] : M[unfilled] [AJCC Stage: ____] : AJCC Stage: [unfilled] [Treatment Protocol] : Treatment Protocol [de-identified] : The patient presented in 1997, at age 52, with a mass in the right breast she discovered on breast self-examination.\par \par Dr. Tessy Bender performed an excisional biopsy on September 22, 1997 which demonstrated a 1.2 cm well differentiated infiltrating ductal carcinoma which was ER positive (25%), ME positive (50%) and CerbB2 positive in 30% of the cells. There was a 10-20% component of DCIS.\par \par On October 13, 1997 the patient underwent right axillary lymph node dissection which demonstrated 24 negative axillary lymph nodes.\par \par Postoperatively the patient received adjuvant IV CMF extending from November 2, 1997 through April 27, 1998.\par \par The patient subsequently received radiation therapy at [facility] ([facility]) under the supervision of Dr. Rick Mendosa. \par \par The patient took tamoxifen from May 1998 through May 2003 and letrozole from March 2006 until March 2011.\par \par [facility] demonstrated variant of uncertain significance of BARD1(p.S761N) on 1/7/2015. [de-identified] : well-differentiated infiltrating ductal carcinoma ER positive AR positive  [FreeTextEntry1] : On observation. [de-identified] : 10/14/2022\par Patient returns today for followup for history of breast cancer and to rule out metastatic breast cancer.\par Previously noted Right Breast pain resolved;\par Patient denies any breast masses, breast tenderness, skin changes or nipple discharge.\par Patient denies any SOB, CP, abdominal pain, bone pain, headache, or unexplained weight loss\par \par R Mammo/Sono 11/2021 Birads 2.0\par MRI Breast 11/11/21 no evidence of malignancy BIRADS1; benign skin lesion RLOQ 7mm\par \par \par HEALTH MAINTENANCE\par Last saw PCP Dr Aamir Chicas 9/2022\par Last eye exam late 2020, with benign findings. Beginnings of cataract.   is ophtho\par CARDS Dr. Misael Garcia - \par ENDO Dr. Aguilar- DM; osteopenia, DEXA 1/4/2021 - osteopenia in spine -1.6, femoral neck -2.1, total hip -2.2.  On calcium/vit D.  On atorvastatin 40mg daily;\par Last colonoscopy 08/18/2020, no polyps found. Due in 3 yrs. Family hx of colon cancer. \par GYN Dr Mtz, 9/2022. Denies vaginal spotting/bleeding.\par Most recent dermatology evaluation 1/2021. Four joanne's procedure for precancerous lesions. Patient states she is up-to-date..\par Active - cardio and weight lifting \par

## 2022-10-17 NOTE — PHYSICAL EXAM
Received refill request for Humira as the resident on call. Reviewed patient's chart and attached communication. Patient last seen 5/4/21 for psoriasis. RTC plan was for 6 months, however he did not follow up nor does he have an existing appt.     After reviewing the medication list and assessment and plan from last visit, the refill request was declined. I will message the scheduling pool to ensure he gets scheduled.    CC'ing Dr. Diana as JORJE Vasquez MD   [Fully active, able to carry on all pre-disease performance without restriction] : Status 0 - Fully active, able to carry on all pre-disease performance without restriction [Thin] : thin [Normal] : affect appropriate [de-identified] : Right breast: scars UIQ and axilla, fibrocystic changes UOQ,  no mass. Left breast: scar UIQ,fibrocystic changes, no mass; dense breasts b/l; no nipple discharge illicted; no adenopathy bilateral; no erythema or swelling of the breast.

## 2022-11-30 DIAGNOSIS — R92.2 INCONCLUSIVE MAMMOGRAM: ICD-10-CM

## 2022-12-14 ENCOUNTER — APPOINTMENT (OUTPATIENT)
Dept: INTERNAL MEDICINE | Facility: CLINIC | Age: 77
End: 2022-12-14
Payer: MEDICARE

## 2022-12-14 VITALS
RESPIRATION RATE: 16 BRPM | HEART RATE: 53 BPM | HEIGHT: 65 IN | SYSTOLIC BLOOD PRESSURE: 130 MMHG | BODY MASS INDEX: 19.33 KG/M2 | DIASTOLIC BLOOD PRESSURE: 80 MMHG | TEMPERATURE: 97.7 F | OXYGEN SATURATION: 99 % | WEIGHT: 116 LBS

## 2022-12-14 DIAGNOSIS — M76.71 PERONEAL TENDINITIS, RIGHT LEG: ICD-10-CM

## 2022-12-14 PROCEDURE — 99215 OFFICE O/P EST HI 40 MIN: CPT

## 2023-01-11 PROBLEM — M76.71 PERONEAL TENDINITIS OF RIGHT LOWER EXTREMITY: Status: ACTIVE | Noted: 2022-08-12

## 2023-01-11 NOTE — ASSESSMENT
[FreeTextEntry1] : Physical examination reveals a well-developed woman in no acute distress blood pressure 130/80 height 5 foot 5 inches weight 116 pounds BMI 19.3 temperature 97.7 °F heart rate of 53 respirations at 16 oxygen saturation on room air was 99% HEENT was unremarkable chest was clear cardiovascular exam was regular with no extra heart sounds or murmurs abdomen was soft and nontender extremities showed no clubbing cyanosis or edema neurologic exam was nonfocal patient's right breast cancer is defined as a T1c N0M0 stage I she is followed actively by a oncologist endocrinologist and orthopedist patient had recent blood test which were significant for total cholesterol 145 HDL 64 LDL 67 fasting blood sugar of 167 hemoglobin A1c of 7.2 results will be forwarded to her endocrinologist patient will be scheduling a bone density in January 2023 of note patient had a calcium score TC scan with some nonocclusive disease in the right coronary and left anterior descending patient's last colonoscopy was in August 2020 patient is supplementing her vitamin D with a recent level of 64.8

## 2023-01-11 NOTE — HISTORY OF PRESENT ILLNESS
[Spouse] : spouse [FreeTextEntry1] : 77-year-old woman comes to the office for follow-up to review her medications and discuss her overall health recent issues with breast cancer [de-identified] : Comes to the office for follow-up with a history of elevated cholesterol type 2 diabetes vitamin D deficiency breast cancer osteopenia and peroneal tendinitis patient's review of systems is significant for nocturia joint stiffness remaining review of systems is noncontributory

## 2023-01-11 NOTE — HEALTH RISK ASSESSMENT
[Never] : Never [No] : No [No falls in past year] : Patient reported no falls in the past year [0] : 2) Feeling down, depressed, or hopeless: Not at all (0) [PHQ-2 Negative - No further assessment needed] : PHQ-2 Negative - No further assessment needed [[id number]] : 0

## 2023-01-19 ENCOUNTER — NON-APPOINTMENT (OUTPATIENT)
Age: 78
End: 2023-01-19

## 2023-01-23 ENCOUNTER — APPOINTMENT (OUTPATIENT)
Dept: RADIOLOGY | Facility: HOSPITAL | Age: 78
End: 2023-01-23
Payer: MEDICARE

## 2023-01-23 ENCOUNTER — OUTPATIENT (OUTPATIENT)
Dept: OUTPATIENT SERVICES | Facility: HOSPITAL | Age: 78
LOS: 1 days | End: 2023-01-23
Payer: MEDICARE

## 2023-01-23 DIAGNOSIS — M85.80 OTHER SPECIFIED DISORDERS OF BONE DENSITY AND STRUCTURE, UNSPECIFIED SITE: ICD-10-CM

## 2023-01-23 PROCEDURE — 77080 DXA BONE DENSITY AXIAL: CPT | Mod: 26

## 2023-01-23 PROCEDURE — 77080 DXA BONE DENSITY AXIAL: CPT

## 2023-02-27 LAB
ALBUMIN SERPL ELPH-MCNC: 4.7 G/DL
ALP BLD-CCNC: 88 U/L
ALT SERPL-CCNC: 15 U/L
ANION GAP SERPL CALC-SCNC: 10 MMOL/L
APPEARANCE: CLEAR
AST SERPL-CCNC: 17 U/L
BASOPHILS # BLD AUTO: 0.06 K/UL
BASOPHILS NFR BLD AUTO: 0.9 %
BILIRUB SERPL-MCNC: 0.8 MG/DL
BILIRUBIN URINE: NEGATIVE
BLOOD URINE: NEGATIVE
BUN SERPL-MCNC: 11 MG/DL
CALCIUM SERPL-MCNC: 9.8 MG/DL
CHLORIDE SERPL-SCNC: 101 MMOL/L
CHOLEST SERPL-MCNC: 145 MG/DL
CO2 SERPL-SCNC: 31 MMOL/L
COLOR: NORMAL
CREAT SERPL-MCNC: 0.58 MG/DL
CRP SERPL HS-MCNC: 0.3 MG/L
EGFR: 94 ML/MIN/1.73M2
EOSINOPHIL # BLD AUTO: 0.08 K/UL
EOSINOPHIL NFR BLD AUTO: 1.2 %
ESTIMATED AVERAGE GLUCOSE: 160 MG/DL
GLUCOSE BS SERPL-MCNC: 171 MG/DL
GLUCOSE QUALITATIVE U: NEGATIVE
GLUCOSE SERPL-MCNC: 167 MG/DL
HBA1C MFR BLD HPLC: 7.2 %
HCT VFR BLD CALC: 39.3 %
HDLC SERPL-MCNC: 64 MG/DL
HGB BLD-MCNC: 13.5 G/DL
IMM GRANULOCYTES NFR BLD AUTO: 0.3 %
KETONES URINE: NEGATIVE
LDLC SERPL CALC-MCNC: 67 MG/DL
LEUKOCYTE ESTERASE URINE: NEGATIVE
LYMPHOCYTES # BLD AUTO: 1.44 K/UL
LYMPHOCYTES NFR BLD AUTO: 21.7 %
MAN DIFF?: NORMAL
MCHC RBC-ENTMCNC: 30.2 PG
MCHC RBC-ENTMCNC: 34.4 GM/DL
MCV RBC AUTO: 87.9 FL
MONOCYTES # BLD AUTO: 0.43 K/UL
MONOCYTES NFR BLD AUTO: 6.5 %
NEUTROPHILS # BLD AUTO: 4.61 K/UL
NEUTROPHILS NFR BLD AUTO: 69.4 %
NITRITE URINE: NEGATIVE
NONHDLC SERPL-MCNC: 81 MG/DL
PH URINE: 7
PLATELET # BLD AUTO: 260 K/UL
POTASSIUM SERPL-SCNC: 4.3 MMOL/L
PROT SERPL-MCNC: 6.3 G/DL
PROTEIN URINE: NORMAL
RBC # BLD: 4.47 M/UL
RBC # FLD: 13 %
SODIUM SERPL-SCNC: 142 MMOL/L
SPECIFIC GRAVITY URINE: 1.02
T4 FREE SERPL-MCNC: 1 NG/DL
TRIGL SERPL-MCNC: 69 MG/DL
TSH SERPL-ACNC: 0.55 UIU/ML
UROBILINOGEN URINE: NORMAL
WBC # FLD AUTO: 6.64 K/UL

## 2023-04-13 ENCOUNTER — OUTPATIENT (OUTPATIENT)
Dept: OUTPATIENT SERVICES | Facility: HOSPITAL | Age: 78
LOS: 1 days | Discharge: HOME | End: 2023-04-13

## 2023-04-13 DIAGNOSIS — C50.919 MALIGNANT NEOPLASM OF UNSPECIFIED SITE OF UNSPECIFIED FEMALE BREAST: ICD-10-CM

## 2023-04-13 LAB
25(OH)D3 SERPL-MCNC: 63.4 NG/ML
ALBUMIN SERPL ELPH-MCNC: 4.7 G/DL
ALP BLD-CCNC: 93 U/L
ALT SERPL-CCNC: 16 U/L
ANION GAP SERPL CALC-SCNC: 15 MMOL/L
APPEARANCE: CLEAR
AST SERPL-CCNC: 18 U/L
BASOPHILS # BLD AUTO: 0.04 K/UL
BASOPHILS NFR BLD AUTO: 0.6 %
BILIRUB SERPL-MCNC: 0.9 MG/DL
BILIRUBIN URINE: NEGATIVE
BLOOD URINE: NEGATIVE
BUN SERPL-MCNC: 11 MG/DL
CALCIUM SERPL-MCNC: 9.9 MG/DL
CHLORIDE SERPL-SCNC: 100 MMOL/L
CHOLEST SERPL-MCNC: 163 MG/DL
CO2 SERPL-SCNC: 28 MMOL/L
COLOR: YELLOW
CREAT SERPL-MCNC: 0.57 MG/DL
EGFR: 94 ML/MIN/1.73M2
EOSINOPHIL # BLD AUTO: 0.1 K/UL
EOSINOPHIL NFR BLD AUTO: 1.6 %
ESTIMATED AVERAGE GLUCOSE: 169 MG/DL
GLUCOSE BS SERPL-MCNC: 137 MG/DL
GLUCOSE QUALITATIVE U: NEGATIVE
GLUCOSE SERPL-MCNC: 138 MG/DL
HBA1C MFR BLD HPLC: 7.5 %
HCT VFR BLD CALC: 40.1 %
HDLC SERPL-MCNC: 62 MG/DL
HGB BLD-MCNC: 13.4 G/DL
IMM GRANULOCYTES NFR BLD AUTO: 0.3 %
KETONES URINE: NEGATIVE
LDLC SERPL CALC-MCNC: 85 MG/DL
LEUKOCYTE ESTERASE URINE: NEGATIVE
LYMPHOCYTES # BLD AUTO: 1.55 K/UL
LYMPHOCYTES NFR BLD AUTO: 24.4 %
MAN DIFF?: NORMAL
MCHC RBC-ENTMCNC: 29.5 PG
MCHC RBC-ENTMCNC: 33.4 GM/DL
MCV RBC AUTO: 88.3 FL
MONOCYTES # BLD AUTO: 0.4 K/UL
MONOCYTES NFR BLD AUTO: 6.3 %
NEUTROPHILS # BLD AUTO: 4.23 K/UL
NEUTROPHILS NFR BLD AUTO: 66.8 %
NITRITE URINE: NEGATIVE
NONHDLC SERPL-MCNC: 101 MG/DL
PH URINE: 6.5
PLATELET # BLD AUTO: 266 K/UL
POTASSIUM SERPL-SCNC: 4.2 MMOL/L
PROT SERPL-MCNC: 6.4 G/DL
PROTEIN URINE: NORMAL
RBC # BLD: 4.54 M/UL
RBC # FLD: 13.1 %
SODIUM SERPL-SCNC: 142 MMOL/L
SPECIFIC GRAVITY URINE: 1.02
T4 FREE SERPL-MCNC: 1 NG/DL
TRIGL SERPL-MCNC: 82 MG/DL
TSH SERPL-ACNC: 0.7 UIU/ML
UROBILINOGEN URINE: NORMAL
VIT B12 SERPL-MCNC: 562 PG/ML
WBC # FLD AUTO: 6.34 K/UL

## 2023-04-14 ENCOUNTER — APPOINTMENT (OUTPATIENT)
Dept: HEMATOLOGY ONCOLOGY | Facility: CLINIC | Age: 78
End: 2023-04-14

## 2023-04-14 ENCOUNTER — APPOINTMENT (OUTPATIENT)
Dept: HEMATOLOGY ONCOLOGY | Facility: CLINIC | Age: 78
End: 2023-04-14
Payer: MEDICARE

## 2023-04-14 VITALS
SYSTOLIC BLOOD PRESSURE: 136 MMHG | OXYGEN SATURATION: 98 % | TEMPERATURE: 97 F | WEIGHT: 117.95 LBS | RESPIRATION RATE: 16 BRPM | DIASTOLIC BLOOD PRESSURE: 75 MMHG | HEART RATE: 76 BPM | BODY MASS INDEX: 19.63 KG/M2

## 2023-04-14 PROCEDURE — 99214 OFFICE O/P EST MOD 30 MIN: CPT

## 2023-04-15 NOTE — ASSESSMENT
[FreeTextEntry1] : Stage I infiltrating ductal carcinoma right breast 1997\par     Status post lumpectomy/ALND\par     Status post CMF\par     Status post RT\par     Status post tamoxifen x5 years\par     Status post letrozole x5 years.\par     Mammo/Sono 11/2022 - will obtain report from [facility]\par     BMD 1/2023 c/w osteoporosis; following with PMD and endocrine\par     Clinically [facility]\par     Labs reviewed 4/13/23\par     Patient had the opportunity to have all their questions answered to their satisfaction \par     FU in 6 months\par \par HEALTH MAINTENANCE SCREENING RECOMMENDATIONS\par PMD at least annually with lipid checks/bloodwork, \par GYN at least annually and PAP test screening per their recommendations, \par GI for colon cancer screening/colonoscopy at least every 10 years \par dermatology for annual skin checks  \par ophthalmology for annual eye exams \par

## 2023-04-15 NOTE — PHYSICAL EXAM
[Fully active, able to carry on all pre-disease performance without restriction] : Status 0 - Fully active, able to carry on all pre-disease performance without restriction [Thin] : thin [Normal] : affect appropriate [de-identified] : Right breast: scars UIQ and axilla, fibrocystic changes UOQ,  no mass. Left breast: scar UIQ,fibrocystic changes, no mass; dense breasts b/l; no nipple discharge illicted; no adenopathy bilateral; no erythema or swelling of the breast.

## 2023-04-15 NOTE — HISTORY OF PRESENT ILLNESS
[Disease: _____________________] : Disease: [unfilled] [T: ___] : T[unfilled] [N: ___] : N[unfilled] [M: ___] : M[unfilled] [AJCC Stage: ____] : AJCC Stage: [unfilled] [Treatment Protocol] : Treatment Protocol [de-identified] : The patient presented in 1997, at age 52, with a mass in the right breast she discovered on breast self-examination.\par \par Dr. Tessy Bender performed an excisional biopsy on September 22, 1997 which demonstrated a 1.2 cm well differentiated infiltrating ductal carcinoma which was ER positive (25%), [place] positive (50%) and CerbB2 positive in 30% of the cells. There was a 10-20% component of DCIS.\par \par On October 13, 1997 the patient underwent right axillary lymph node dissection which demonstrated 24 negative axillary lymph nodes.\par \par Postoperatively the patient received adjuvant IV CMF extending from November 2, 1997 through April 27, 1998.\par \par The patient subsequently received radiation therapy at [facility] ([facility]) under the supervision of Dr. Rick Mendosa. \par \par The patient took tamoxifen from May 1998 through May 2003 and letrozole from March 2006 until March 2011.\par \par [facility] demonstrated variant of uncertain significance of BARD1(p.S761N) on 1/7/2015. [de-identified] : well-differentiated infiltrating ductal carcinoma ER positive MA positive  [FreeTextEntry1] : On observation. [de-identified] : 4/14/23\par Transferring care from Dr. Pereira to me today.\par All of the patient's prior records including radiology, pathology and prior notes reviewed; Past Medical History, Past Surgical History, Family History and Social history reviewed and updated in the patient's chart.\par \par Patient returns today for followup for history of breast cancer and to rule out recurrence \par Patient denies any breast masses, breast tenderness, skin changes or nipple discharge.\par Patient denies any SOB, CP, abdominal pain, bone pain, headache, or unexplained weight loss\par \par R Mammo/Sono 11/2022 at [facility]\par MRI Breast 11/11/21 no evidence of malignancy BIRADS1; benign skin lesion RLOQ 7mm\par \par \par HEALTH MAINTENANCE\par Last saw PCP Dr Aamir Chicas 9/2022\par Last eye exam late 2020, with benign findings. Beginnings of cataract.   is ophtho\par CARDS Dr. Misael Garcia - \par ENDO Dr. Aguilar- DM on metformin BID\par DEXA 1/4/2021 - osteoporosis; spine -1.4, femoral neck -2.4,  total hip -2.6.  On calcium/vit D; wishes to hold off on Prolia\par Last colonoscopy 08/18/2020, no polyps found. Due in 3 yrs. Family hx of colon cancer. \par GYN Dr Mtz, 9/2022. Denies vaginal spotting/bleeding.\par Most recent dermatology evaluation 1/2021. Four joanne's procedure for precancerous lesions. Patient states she is up-to-date..\par Active - cardio and weight lifting \par

## 2023-04-19 ENCOUNTER — APPOINTMENT (OUTPATIENT)
Dept: INTERNAL MEDICINE | Facility: CLINIC | Age: 78
End: 2023-04-19
Payer: MEDICARE

## 2023-04-19 VITALS
DIASTOLIC BLOOD PRESSURE: 70 MMHG | OXYGEN SATURATION: 98 % | WEIGHT: 117 LBS | BODY MASS INDEX: 19.49 KG/M2 | SYSTOLIC BLOOD PRESSURE: 116 MMHG | HEART RATE: 79 BPM | HEIGHT: 65 IN | RESPIRATION RATE: 16 BRPM | TEMPERATURE: 97.4 F

## 2023-04-19 DIAGNOSIS — M85.80 OTHER SPECIFIED DISORDERS OF BONE DENSITY AND STRUCTURE, UNSPECIFIED SITE: ICD-10-CM

## 2023-04-19 PROCEDURE — 99213 OFFICE O/P EST LOW 20 MIN: CPT

## 2023-04-29 NOTE — PHYSICAL EXAM
[No Acute Distress] : no acute distress [Well Nourished] : well nourished [Well Developed] : well developed [Well-Appearing] : well-appearing [Normal Voice/Communication] : normal voice/communication [Normal Sclera/Conjunctiva] : normal sclera/conjunctiva [PERRL] : pupils equal round and reactive to light [EOMI] : extraocular movements intact [Normal Outer Ear/Nose] : the outer ears and nose were normal in appearance [Normal Oropharynx] : the oropharynx was normal [Normal TMs] : both tympanic membranes were normal [Normal Nasal Mucosa] : the nasal mucosa was normal [No JVD] : no jugular venous distention [No Lymphadenopathy] : no lymphadenopathy [Supple] : supple [Thyroid Normal, No Nodules] : the thyroid was normal and there were no nodules present [No Respiratory Distress] : no respiratory distress  [No Accessory Muscle Use] : no accessory muscle use [Clear to Auscultation] : lungs were clear to auscultation bilaterally [Normal Rate] : normal rate  [Regular Rhythm] : with a regular rhythm [Normal S1, S2] : normal S1 and S2 [No Murmur] : no murmur heard [No Carotid Bruits] : no carotid bruits [No Abdominal Bruit] : a ~M bruit was not heard ~T in the abdomen [No Varicosities] : no varicosities [Pedal Pulses Present] : the pedal pulses are present [No Edema] : there was no peripheral edema [No Palpable Aorta] : no palpable aorta [No Extremity Clubbing/Cyanosis] : no extremity clubbing/cyanosis [Declined Breast Exam] : declined breast exam  [Soft] : abdomen soft [Non Tender] : non-tender [Non-distended] : non-distended [No Masses] : no abdominal mass palpated [No HSM] : no HSM [Normal Bowel Sounds] : normal bowel sounds [Declined Rectal Exam] : declined rectal exam [No CVA Tenderness] : no CVA  tenderness [No Spinal Tenderness] : no spinal tenderness [No Joint Swelling] : no joint swelling [Grossly Normal Strength/Tone] : grossly normal strength/tone [No Rash] : no rash [No Skin Lesions] : no skin lesions [Coordination Grossly Intact] : coordination grossly intact [No Focal Deficits] : no focal deficits [Normal Gait] : normal gait [Deep Tendon Reflexes (DTR)] : deep tendon reflexes were 2+ and symmetric [Speech Grossly Normal] : speech grossly normal [Memory Grossly Normal] : memory grossly normal [Normal Affect] : the affect was normal [Alert and Oriented x3] : oriented to person, place, and time [Normal Mood] : the mood was normal [Normal Insight/Judgement] : insight and judgment were intact [Kyphosis] : no kyphosis [Scoliosis] : no scoliosis [Acne] : no acne

## 2023-04-29 NOTE — HISTORY OF PRESENT ILLNESS
[FreeTextEntry1] : 78-year-old woman comes to the office with her  for follow-up to review her medications and discuss her overall health recent issues with diabetes [de-identified] : Comes to the office for follow-up with a history of type 2 diabetes adenocarcinoma of the breast stage I on the right elevated cholesterol vitamin D deficiency osteopenia review of systems is significant for nocturia joint stiffness remaining review of systems is noncontributory

## 2023-04-29 NOTE — ASSESSMENT
[FreeTextEntry1] : Physical exam shows a well-developed woman in no acute distress blood pressure 116/70 height 5 foot 5 inches weight 117 pounds BMI 19.47 heart rate of 79 respirations 16 HEENT is unremarkable chest was clear cardiovascular exam was regular with no extra heart sounds or murmurs abdomen was soft extremities showed no edema neurologic exam was nonfocal patient follows with her oncologist concerning her breast cancer recent blood test were performed significant for fasting blood sugar of 138 cholesterol 163 LDL 85 HDL 62 A1c 7.5% previous A1c was 7.9% she will continue metformin at thousand twice a day and glipizide 2.5 mg at bedtime patient will consider an endocrinology consultation if her numbers do not improve

## 2023-05-04 ENCOUNTER — APPOINTMENT (OUTPATIENT)
Dept: OBGYN | Facility: CLINIC | Age: 78
End: 2023-05-04

## 2023-07-27 ENCOUNTER — APPOINTMENT (OUTPATIENT)
Dept: RHEUMATOLOGY | Facility: CLINIC | Age: 78
End: 2023-07-27
Payer: MEDICARE

## 2023-07-27 ENCOUNTER — NON-APPOINTMENT (OUTPATIENT)
Age: 78
End: 2023-07-27

## 2023-07-27 VITALS
TEMPERATURE: 97.3 F | HEIGHT: 65 IN | BODY MASS INDEX: 19.49 KG/M2 | RESPIRATION RATE: 16 BRPM | DIASTOLIC BLOOD PRESSURE: 68 MMHG | SYSTOLIC BLOOD PRESSURE: 110 MMHG | OXYGEN SATURATION: 97 % | WEIGHT: 117 LBS | HEART RATE: 93 BPM

## 2023-07-27 DIAGNOSIS — M54.32 SCIATICA, LEFT SIDE: ICD-10-CM

## 2023-07-27 PROCEDURE — 99204 OFFICE O/P NEW MOD 45 MIN: CPT

## 2023-07-28 PROBLEM — M54.32 SCIATICA OF LEFT SIDE: Status: ACTIVE | Noted: 2023-07-28

## 2023-07-28 NOTE — PHYSICAL EXAM
[General Appearance - In No Acute Distress] : in no acute distress [General Appearance - Alert] : alert [General Appearance - Well Nourished] : well nourished [Sclera] : the sclera and conjunctiva were normal [Oropharynx] : the oropharynx was normal [Neck Appearance] : the appearance of the neck was normal [] : no respiratory distress [Edema] : there was no peripheral edema [No Spinal Tenderness] : no spinal tenderness [Abnormal Walk] : normal gait [Musculoskeletal - Swelling] : no joint swelling seen [No Focal Deficits] : no focal deficits [Oriented To Time, Place, And Person] : oriented to person, place, and time [Impaired Insight] : insight and judgment were intact [Affect] : the affect was normal

## 2023-07-28 NOTE — ASSESSMENT
[FreeTextEntry1] : HARDIK AGUILAR is a 78 year old woman with below --\par \par # OP on recent DEXA, prior one with high FRAX despite ostepenia read, + risk factors of father hip fx \par - Discussed that given her DEXA results she is at increased risk for traumatic and possibly spontaneous fractures and warrants treatment at this time. Discussed OP management options including PO bisphosphonates, IV bisphosphonates, Prolia, Forteo with patient. Given that she is treatment naive and without GI issues, I recommend PO bisphosphonates given long term data. \par - Start Fosamax qweekly. Risks and benefits were d/w patient including but not limited to myalgias, flu like symptoms, avascular necrosis of the jaw, gastric intolerance and atypical fractures. No plan for dental surgery, aware to notify us 3 months prior to any planned oral surgery. Pt was instructed to use the medication on an empty stomach and avoid a flat posture after 30 minutes of use. \par - recent labs stable, c/w current supplemental Vit D and Ca levels\par - Increase weight bearing exercise for goal of 30min 3-4x/week to maintain BMD - modalities reviewed with patient \par - repeat DEXA in 1/2025\par \par # L sided mild sciatica\par - c/w conservative measures for now\par - if worsening sx, advised to let me know - will start with XRay and PT \par \par RTC following next DEXA, sooner if difficulty tolerating med

## 2023-07-28 NOTE — HISTORY OF PRESENT ILLNESS
[FreeTextEntry1] : HARDIK AGUILAR is a 78 year old woman who presents with OP on recent DEXA. Prior DEXA in osteopenia range but with high FRAX, pt declined treatment with Endo in Sept 2022. Now lowest T score -2.6 in hip. \par \par No personal fx hx, + father hip fx in 80s\par Steady on feet, no falls, exercises regularly - Pilates, yoga, tennis  \par + supplemental Ca / Vit D \par No tobacco / no ETOH / 1 month of moderate dose steroids x 1 \par No GERD, dysphagia \par No plans for dental sx \par \par + L sided mild sciatica but doesn't limit ADLs, exercise improves it, doesn't awake her from sleep \par

## 2023-08-02 ENCOUNTER — APPOINTMENT (OUTPATIENT)
Dept: SURGERY | Facility: CLINIC | Age: 78
End: 2023-08-02
Payer: MEDICARE

## 2023-08-02 VITALS — WEIGHT: 117 LBS | TEMPERATURE: 97.6 F | BODY MASS INDEX: 19.47 KG/M2

## 2023-08-02 DIAGNOSIS — Z12.11 ENCOUNTER FOR SCREENING FOR MALIGNANT NEOPLASM OF COLON: ICD-10-CM

## 2023-08-02 DIAGNOSIS — Z85.3 PERSONAL HISTORY OF MALIGNANT NEOPLASM OF BREAST: ICD-10-CM

## 2023-08-02 PROCEDURE — 99212 OFFICE O/P EST SF 10 MIN: CPT

## 2023-08-02 NOTE — HISTORY OF PRESENT ILLNESS
[de-identified] : The patient has been feeling well since her last colonoscopy. She has been eating well and passing stool easily. The patient denied any BRBPR. There is a strong FH of colon CA.

## 2023-08-02 NOTE — ASSESSMENT
[FreeTextEntry1] : Discussion regarding all options and risks Bowel prep[ written and explained  Scheduled for colonoscopy on 8/29/23 All lab values and imaging studies reviewed Discussed with Medicine

## 2023-08-02 NOTE — PHYSICAL EXAM
[Normal Breath Sounds] : Normal breath sounds [Normal Heart Sounds] : normal heart sounds [Normal Rate and Rhythm] : normal rate and rhythm [Abdominal Masses] : No abdominal masses [No Rash or Lesion] : No rash or lesion [Abdomen Tenderness] : ~T ~M No abdominal tenderness [de-identified] : nl [de-identified] : nl [de-identified] : nl

## 2023-08-07 ENCOUNTER — RX RENEWAL (OUTPATIENT)
Age: 78
End: 2023-08-07

## 2023-08-07 RX ORDER — ATORVASTATIN CALCIUM 40 MG/1
40 TABLET, FILM COATED ORAL
Qty: 30 | Refills: 5 | Status: ACTIVE | COMMUNITY
Start: 2023-01-20 | End: 1900-01-01

## 2023-08-22 ENCOUNTER — APPOINTMENT (OUTPATIENT)
Dept: INTERNAL MEDICINE | Facility: CLINIC | Age: 78
End: 2023-08-22
Payer: MEDICARE

## 2023-08-22 VITALS
WEIGHT: 117 LBS | DIASTOLIC BLOOD PRESSURE: 72 MMHG | HEIGHT: 65 IN | OXYGEN SATURATION: 96 % | HEART RATE: 88 BPM | TEMPERATURE: 97.8 F | BODY MASS INDEX: 19.49 KG/M2 | SYSTOLIC BLOOD PRESSURE: 114 MMHG

## 2023-08-22 DIAGNOSIS — Z23 ENCOUNTER FOR IMMUNIZATION: ICD-10-CM

## 2023-08-22 PROCEDURE — 90677 PCV20 VACCINE IM: CPT

## 2023-08-22 PROCEDURE — G0009: CPT

## 2023-08-22 PROCEDURE — 99213 OFFICE O/P EST LOW 20 MIN: CPT | Mod: 25

## 2023-08-22 RX ORDER — BLOOD-GLUCOSE METER
W/DEVICE EACH MISCELLANEOUS
Qty: 1 | Refills: 0 | Status: DISCONTINUED | COMMUNITY
Start: 2022-09-14 | End: 2023-08-22

## 2023-08-22 RX ORDER — BLOOD SUGAR DIAGNOSTIC
STRIP MISCELLANEOUS
Qty: 100 | Refills: 3 | Status: DISCONTINUED | COMMUNITY
Start: 2022-09-14 | End: 2023-08-22

## 2023-08-22 RX ORDER — ISOPROPYL ALCOHOL 70 ML/100ML
SWAB TOPICAL
Qty: 100 | Refills: 3 | Status: DISCONTINUED | COMMUNITY
Start: 2022-09-14 | End: 2023-08-22

## 2023-08-22 RX ORDER — LANCETS
EACH MISCELLANEOUS
Qty: 100 | Refills: 3 | Status: DISCONTINUED | COMMUNITY
Start: 2022-09-14 | End: 2023-08-22

## 2023-08-22 NOTE — HISTORY OF PRESENT ILLNESS
[FreeTextEntry1] : Establish care. [de-identified] : Most pleasant 78-year-old physically active white female  with a history of type 2 diabetes, hyperlipidemia, osteoporosis, family history colon cancer, personal history of right intraductal triple positive breast cancer 1997 stage T1c clinically [facility], who comes in to establish care following her providers departure from the practice.  She has no acute concerns. Does not check her sugars at home.  Eats quite healthy. Recently started on bisphosphonate for interval progression between 2021 and 2023 of her osteopenia to osteoporosis -2.6 left hip total, by rheumatology.

## 2023-08-22 NOTE — ASSESSMENT
[FreeTextEntry1] : *Remote history right intraductal breast cancer, 1997.  Triple positive, node-negative T1c.  Status post lumpectomy chemo external beam radiation therapy followed by 5 years of tamoxifen and then 5 years letrozole.  Mammograms under the care of her oncologist every November.  *Type 2 diabetes.  Diagnosed 2021 after a prolonged period of impaired fasting glucose.  Metformin 1000 twice daily, former PCP added 2.5 mg glipizide extended release at bedtime for A1c in the mid sevens.  Statin no ACE, but no known nephropathy; no baby aspirin but no known cardiovascular disease.  Saw in rapid succession Dr. Streeter and Dr. Aguilar does not with either of them for unclear reasons.  Gets annual eye exams no known retinopathy nephropathy or neuropathy.  Asked to move glipizide to the morning, insurance did not cover Januvia.  SGLT 2 another treatment option.  Due for microalbumin A1c.  *Hyperlipidemia.  Atorvastatin 40 without complication.  Up-to-date with LFTs lipid panel.  Recheck spring 2024.  *Osteoporosis.  -2.6 hip, -1.4 spine consistent with mild hip osteoporosis started on Fosamax 70 mg weekly 2023 by rheumatology, continue on calcium vitamin D.  *Family history colon cancer.  Father diagnosed with colon cancer age?.  Has been on every 3 year screening, no polyps on colonoscopy 2017 2020 by Dr. Ha.  Colonoscopy next week.  Optimized for the contemplated procedure.  *Routine adult health maintenance.  Health screening labs April 2023 recheck April 2024.  Has aged out of HBV HCV HIV screening. Vaccinations: Tdap due Prevnar 20 (Prevnar 13 2016) provided today Shingrix 2020 COVID up-to-date through bivalent booster, encouraged new monovalent booster later this fall.  It was a pleasure to visit with Ms. Cruz looked today.  Answered questions best I could. Disposition A1c now with microalbumin; A1c in 3 months follow-up 6 months. Time 25 minutes

## 2023-08-22 NOTE — HEALTH RISK ASSESSMENT
[No falls in past year] : Patient reported no falls in the past year [0] : 2) Feeling down, depressed, or hopeless: Not at all (0) [PHQ-2 Negative - No further assessment needed] : PHQ-2 Negative - No further assessment needed [Never] : Never [No] : In the past 12 months have you used drugs other than those required for medical reasons? No [Audit-CScore] : 0 [de-identified] : Quite active in the gym weights other exercises [de-identified] : Very healthy [[facility]] : 8 [[id number]] : 0

## 2023-08-23 LAB
CREAT SPEC-SCNC: 176 MG/DL
CREAT/PROT UR: 0.1 RATIO
ESTIMATED AVERAGE GLUCOSE: 134 MG/DL
HBA1C MFR BLD HPLC: 6.3 %
PROT UR-MCNC: 16 MG/DL

## 2023-08-28 ENCOUNTER — TRANSCRIPTION ENCOUNTER (OUTPATIENT)
Age: 78
End: 2023-08-28

## 2023-08-29 ENCOUNTER — OUTPATIENT (OUTPATIENT)
Dept: OUTPATIENT SERVICES | Facility: HOSPITAL | Age: 78
LOS: 1 days | End: 2023-08-29
Payer: MEDICARE

## 2023-08-29 ENCOUNTER — APPOINTMENT (OUTPATIENT)
Dept: SURGERY | Facility: HOSPITAL | Age: 78
End: 2023-08-29

## 2023-08-29 DIAGNOSIS — Z80.0 FAMILY HISTORY OF MALIGNANT NEOPLASM OF DIGESTIVE ORGANS: ICD-10-CM

## 2023-08-29 DIAGNOSIS — Z86.010 PERSONAL HISTORY OF COLONIC POLYPS: ICD-10-CM

## 2023-08-29 DIAGNOSIS — Z12.11 ENCOUNTER FOR SCREENING FOR MALIGNANT NEOPLASM OF COLON: ICD-10-CM

## 2023-08-29 LAB — GLUCOSE BLDC GLUCOMTR-MCNC: 125 MG/DL — HIGH (ref 70–99)

## 2023-08-29 PROCEDURE — G0105: CPT

## 2023-08-29 PROCEDURE — 82962 GLUCOSE BLOOD TEST: CPT

## 2023-08-29 RX ORDER — SODIUM CHLORIDE 9 MG/ML
500 INJECTION INTRAMUSCULAR; INTRAVENOUS; SUBCUTANEOUS
Refills: 0 | Status: COMPLETED | OUTPATIENT
Start: 2023-08-29 | End: 2023-08-29

## 2023-08-29 RX ADMIN — SODIUM CHLORIDE 75 MILLILITER(S): 9 INJECTION INTRAMUSCULAR; INTRAVENOUS; SUBCUTANEOUS at 11:30

## 2023-09-26 RX ORDER — GLIPIZIDE 2.5 MG/1
2.5 TABLET, FILM COATED, EXTENDED RELEASE ORAL
Qty: 90 | Refills: 1 | Status: ACTIVE | COMMUNITY
Start: 2023-04-19 | End: 1900-01-01

## 2023-10-12 ENCOUNTER — OUTPATIENT (OUTPATIENT)
Dept: OUTPATIENT SERVICES | Facility: HOSPITAL | Age: 78
LOS: 1 days | Discharge: HOME | End: 2023-10-12

## 2023-10-12 DIAGNOSIS — C50.919 MALIGNANT NEOPLASM OF UNSPECIFIED SITE OF UNSPECIFIED FEMALE BREAST: ICD-10-CM

## 2023-10-13 ENCOUNTER — APPOINTMENT (OUTPATIENT)
Dept: HEMATOLOGY ONCOLOGY | Facility: CLINIC | Age: 78
End: 2023-10-13
Payer: MEDICARE

## 2023-10-13 VITALS
BODY MASS INDEX: 19.26 KG/M2 | HEART RATE: 77 BPM | OXYGEN SATURATION: 98 % | TEMPERATURE: 97.2 F | SYSTOLIC BLOOD PRESSURE: 133 MMHG | RESPIRATION RATE: 16 BRPM | WEIGHT: 115.74 LBS | DIASTOLIC BLOOD PRESSURE: 75 MMHG

## 2023-10-13 PROCEDURE — 99213 OFFICE O/P EST LOW 20 MIN: CPT

## 2024-02-14 ENCOUNTER — APPOINTMENT (OUTPATIENT)
Dept: INTERNAL MEDICINE | Facility: CLINIC | Age: 79
End: 2024-02-14
Payer: MEDICARE

## 2024-02-14 VITALS
BODY MASS INDEX: 19.49 KG/M2 | SYSTOLIC BLOOD PRESSURE: 110 MMHG | TEMPERATURE: 96.8 F | DIASTOLIC BLOOD PRESSURE: 70 MMHG | RESPIRATION RATE: 16 BRPM | HEIGHT: 65 IN | HEART RATE: 81 BPM | WEIGHT: 117 LBS | OXYGEN SATURATION: 96 %

## 2024-02-14 DIAGNOSIS — E55.9 VITAMIN D DEFICIENCY, UNSPECIFIED: ICD-10-CM

## 2024-02-14 DIAGNOSIS — E11.9 TYPE 2 DIABETES MELLITUS W/OUT COMPLICATIONS: ICD-10-CM

## 2024-02-14 DIAGNOSIS — Z00.00 ENCOUNTER FOR GENERAL ADULT MEDICAL EXAMINATION W/OUT ABNORMAL FINDINGS: ICD-10-CM

## 2024-02-14 DIAGNOSIS — C50.911 MALIGNANT NEOPLASM OF UNSPECIFIED SITE OF RIGHT FEMALE BREAST: ICD-10-CM

## 2024-02-14 DIAGNOSIS — E78.00 PURE HYPERCHOLESTEROLEMIA, UNSPECIFIED: ICD-10-CM

## 2024-02-14 LAB
CHOLEST SERPL-MCNC: 152 MG/DL
ESTIMATED AVERAGE GLUCOSE: 128 MG/DL
HBA1C MFR BLD HPLC: 6.1 %
HDLC SERPL-MCNC: 67 MG/DL
LDLC SERPL CALC-MCNC: 72 MG/DL
NONHDLC SERPL-MCNC: 84 MG/DL
TRIGL SERPL-MCNC: 62 MG/DL

## 2024-02-14 PROCEDURE — 99215 OFFICE O/P EST HI 40 MIN: CPT

## 2024-02-14 PROCEDURE — G2211 COMPLEX E/M VISIT ADD ON: CPT

## 2024-02-14 RX ORDER — SODIUM PICOSULFATE, MAGNESIUM OXIDE, AND ANHYDROUS CITRIC ACID 10; 3.5; 12 MG/160ML; G/160ML; G/160ML
10-3.5-12 MG-GM LIQUID ORAL
Qty: 1 | Refills: 0 | Status: DISCONTINUED | COMMUNITY
Start: 2023-08-02 | End: 2024-02-14

## 2024-02-14 NOTE — HISTORY OF PRESENT ILLNESS
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  She has no acute concerns.  Started weekly bisphosphonate 7/2023 for interval progression between 2021 and 2023 of her osteopenia to osteoporosis -2.6 left hip total, by rheumatology.  Shortly thereafter developed a rash under her breasts arms torso, described as erythematous nonblanching papules.  Prescribed topical steroid by dermatology along with antihistamine without improvement after a couple of months stopped bisphosphonate.  She indicates rapid resolution, has not started alt rx.

## 2024-02-14 NOTE — ASSESSMENT
[FreeTextEntry1] : *Remote history right intraductal breast cancer, 1997. Triple positive, node-negative T1c. Status post lumpectomy chemo external beam radiation therapy followed by 5 years of tamoxifen and then 5 years letrozole. Mammograms under the care of her oncologist every November.  *Type 2 diabetes.  A1c currently 6.1%.  Diagnosed 2021 after a prolonged period of impaired fasting glucose. Metformin 1000 twice daily, AM 2.5 mg glipizide extended release .Statin no ACE, but no known nephropathy; no baby aspirin but no known cardiovascular disease. Saw in rapid succession Dr. Streeter and Dr. Aguilar chooses not to follow with them. Gets annual eye exams no known retinopathy nephropathy or neuropathy.. SGLT 2 another treatment option.  *Hyperlipidemia. Atorvastatin 40 without complication. Up-to-date with LFTs lipid panel.  Excellent control currently.  *Osteoporosis. -2.6 hip, -1.4 spine consistent with mild hip osteoporosis started on Fosamax 70 mg weekly 7/2023 by rheumatology, continue on calcium vitamin D.  Stopped actonel, asked to see her rheum back.  *Papular eruption. Resolved. Etio possibly bisphosphonate  *Family history colon cancer. Father diagnosed with colon cancer age80s after missing routine surveillance colonoscopy in setting of adenomatous polpys.. Has been on every 3 year screening, no polyps on colonoscopy 2017 2020 2023 by Dr. Ha.  Next due August 2026.  *Routine adult health maintenance. Health screening labs April 2023 recheck August 2024. Has aged out of HBV HCV HIV screening. Vaccinations: Tdap declined, Prevnar 20 (Prevnar 13 2016) 2023 Shingrix 2020.   COVID monovalent booster UTD, influenza defers.   It was a pleasure to visit with Ms. Rao today. Answered questions best I could. Disposition rpt visit 8/2024. Time 30min

## 2024-02-14 NOTE — HEALTH RISK ASSESSMENT
[Excellent] : ~his/her~  mood as  excellent [No] : In the past 12 months have you used drugs other than those required for medical reasons? No [No falls in past year] : Patient reported no falls in the past year [0] : 2) Feeling down, depressed, or hopeless: Not at all (0) [Audit-CScore] : 0 [de-identified] : dances [de-identified] : healthy [[facility]] : 10 [ISO0Gakph] : 0 [Patient reported mammogram was normal] : Patient reported mammogram was normal [Patient reported PAP Smear was normal] : Patient reported PAP Smear was normal [Patient reported bone density results were abnormal] : Patient reported bone density results were abnormal [Patient reported colonoscopy was normal] : Patient reported colonoscopy was normal [HIV test declined] : HIV test declined [Hepatitis C test declined] : Hepatitis C test declined [Change in mental status noted] : No change in mental status noted [Language] : denies difficulty with language [Behavior] : denies difficulty with behavior [Learning/Retaining New Information] : denies difficulty learning/retaining new information [Handling Complex Tasks] : denies difficulty handling complex tasks [Reasoning] : denies difficulty with reasoning [Spatial Ability and Orientation] : denies difficulty with spatial ability and orientation [None] : None [With Family] : lives with family [Retired] : retired [College] : College [] :  [Sexually Active] : not sexually active [High Risk Behavior] : no high risk behavior [Feels Safe at Home] : Feels safe at home [Fully functional (bathing, dressing, toileting, transferring, walking, feeding)] : Fully functional (bathing, dressing, toileting, transferring, walking, feeding) [Fully functional (using the telephone, shopping, preparing meals, housekeeping, doing laundry, using] : Fully functional and needs no help or supervision to perform IADLs (using the telephone, shopping, preparing meals, housekeeping, doing laundry, using transportation, managing medications and managing finances) [Reports changes in hearing] : Reports no changes in hearing [Reports changes in vision] : Reports no changes in vision [Reports normal functional visual acuity (ie: able to read med bottle)] : Reports normal functional visual acuity [Reports changes in dental health] : Reports no changes in dental health [Smoke Detector] : smoke detector [Carbon Monoxide Detector] : carbon monoxide detector [Guns at Home] : no guns at home [Safety elements used in home] : safety elements used in home [Seat Belt] :  uses seat belt [Sunscreen] : uses sunscreen [Travel to Developing Areas] : does not  travel to developing areas [TB Exposure] : is not being exposed to tuberculosis [Caregiver Concerns] : does not have caregiver concerns [BoneDensityDate] : 01/23 [BoneDensityComments] : -2 -2.6 L hip [Never] : Never

## 2024-02-14 NOTE — PHYSICAL EXAM
[Normal] : affect was normal and insight and judgment were intact [de-identified] : mild great toe bunions

## 2024-03-05 ENCOUNTER — APPOINTMENT (OUTPATIENT)
Dept: RHEUMATOLOGY | Facility: CLINIC | Age: 79
End: 2024-03-05
Payer: MEDICARE

## 2024-03-05 VITALS
HEIGHT: 65 IN | OXYGEN SATURATION: 99 % | HEART RATE: 93 BPM | WEIGHT: 117 LBS | SYSTOLIC BLOOD PRESSURE: 115 MMHG | DIASTOLIC BLOOD PRESSURE: 65 MMHG | BODY MASS INDEX: 19.49 KG/M2 | RESPIRATION RATE: 16 BRPM | TEMPERATURE: 97.5 F

## 2024-03-05 DIAGNOSIS — L50.9 URTICARIA, UNSPECIFIED: ICD-10-CM

## 2024-03-05 DIAGNOSIS — M81.0 AGE-RELATED OSTEOPOROSIS W/OUT CURRENT PATHOLOGICAL FRACTURE: ICD-10-CM

## 2024-03-05 PROCEDURE — 99214 OFFICE O/P EST MOD 30 MIN: CPT

## 2024-03-05 PROCEDURE — G2211 COMPLEX E/M VISIT ADD ON: CPT

## 2024-03-05 RX ORDER — ALENDRONATE SODIUM 70 MG/1
70 TABLET ORAL
Qty: 4 | Refills: 5 | Status: ACTIVE | COMMUNITY
Start: 2023-07-27 | End: 1900-01-01

## 2024-03-05 NOTE — PHYSICAL EXAM
[General Appearance - Alert] : alert [General Appearance - In No Acute Distress] : in no acute distress [General Appearance - Well Nourished] : well nourished [Oropharynx] : the oropharynx was normal [Sclera] : the sclera and conjunctiva were normal [Neck Appearance] : the appearance of the neck was normal [] : no respiratory distress [Edema] : there was no peripheral edema [Musculoskeletal - Swelling] : no joint swelling seen [Abnormal Walk] : normal gait [No Focal Deficits] : no focal deficits [Oriented To Time, Place, And Person] : oriented to person, place, and time [Affect] : the affect was normal [Impaired Insight] : insight and judgment were intact

## 2024-03-06 NOTE — ASSESSMENT
[FreeTextEntry1] : HARDIK AGUILAR is a 79 year old woman with below --   # OP on recent DEXA, prior one with high FRAX despite osteopenia read, + risk factors of father hip fx  - On further discussion with patient and  present at visit today, unclear if hives were truly medication mediated, no other clearly linked allergic reactions to Fosamax.  Patient is amenable to resume but will try lower dose for a few weeks to see how she tolerates. - Resume Fosamax 1/2 tab qweekly x 4 weeks, if no recurrence of hives or other allergic issues or SE, will then resume 1 tab qweekly. If tolerating well can then continue and f/u after next DEXA in 1/2025.  - If allergic hives recur, would offer Evista as next option given if has allergic issues with injections I can't hold them - c/w current supplemental Vit D and Ca levels - Increase weight bearing exercise for goal of 30min 3-4x/week to maintain BMD - modalities reviewed with patient  - repeat DEXA in 1/2025  # L sided mild sciatica - resolved   RTC following next DEXA, sooner if difficulty tolerating med

## 2024-03-06 NOTE — HISTORY OF PRESENT ILLNESS
[FreeTextEntry1] : HARDIK AGUILAR is a 78 year old woman who presents with OP on recent DEXA. Prior DEXA in osteopenia range but with high FRAX, pt declined treatment with Endo in Sept 2022. Now lowest T score -2.6 in hip.   No personal fx hx, + father hip fx in 80s Steady on feet, no falls, exercises regularly - Pilates, yoga, tennis   + supplemental Ca / Vit D  No tobacco / no ETOH / 1 month of moderate dose steroids x 1  No GERD, dysphagia  No plans for dental sx   + L sided mild sciatica but doesn't limit ADLs, exercise improves it, doesn't awake her from sleep   ---------- 3/5/24 -- Trialled 3 doses of Fosamax following last visit then stopped 2/2 development of hives, however these did persist after, and are consistent with her history of hives which thus far have unclear etiology.  Does report that in the past few months her hives have been less active, patient not sure why.  Never had any mucosal swelling.  Would be amenable to reconsideration of medications today.  No trips/falls, taking supplemental vitamin D/Ca, exercising regularly, no plans for dental surgery.  Prior L sided sciatica has improved.

## 2024-04-05 ENCOUNTER — OUTPATIENT (OUTPATIENT)
Dept: OUTPATIENT SERVICES | Facility: HOSPITAL | Age: 79
LOS: 1 days | Discharge: HOME | End: 2024-04-05

## 2024-04-05 DIAGNOSIS — C50.919 MALIGNANT NEOPLASM OF UNSPECIFIED SITE OF UNSPECIFIED FEMALE BREAST: ICD-10-CM

## 2024-04-15 ENCOUNTER — APPOINTMENT (OUTPATIENT)
Dept: HEMATOLOGY ONCOLOGY | Facility: CLINIC | Age: 79
End: 2024-04-15
Payer: MEDICARE

## 2024-04-15 VITALS
OXYGEN SATURATION: 98 % | RESPIRATION RATE: 14 BRPM | BODY MASS INDEX: 19.44 KG/M2 | SYSTOLIC BLOOD PRESSURE: 128 MMHG | WEIGHT: 116.84 LBS | HEART RATE: 79 BPM | DIASTOLIC BLOOD PRESSURE: 80 MMHG | TEMPERATURE: 97.5 F

## 2024-04-15 DIAGNOSIS — C50.911 MALIGNANT NEOPLASM OF UNSPECIFIED SITE OF RIGHT FEMALE BREAST: ICD-10-CM

## 2024-04-15 PROCEDURE — 99214 OFFICE O/P EST MOD 30 MIN: CPT

## 2024-04-15 PROCEDURE — G2211 COMPLEX E/M VISIT ADD ON: CPT

## 2024-04-15 NOTE — HISTORY OF PRESENT ILLNESS
[Disease: _____________________] : Disease: [unfilled] [T: ___] : T[unfilled] [N: ___] : N[unfilled] [M: ___] : M[unfilled] [AJCC Stage: ____] : AJCC Stage: [unfilled] [de-identified] : The patient presented in 1997, at age 52, with a mass in the right breast she discovered on breast self-examination.\par  \par  Dr. Tessy Bender performed an excisional biopsy on September 22, 1997 which demonstrated a 1.2 cm well differentiated infiltrating ductal carcinoma which was ER positive (25%), ID positive (50%) and CerbB2 positive in 30% of the cells. There was a 10-20% component of DCIS.\par  \par  On October 13, 1997 the patient underwent right axillary lymph node dissection which demonstrated 24 negative axillary lymph nodes.\par  \par  Postoperatively the patient received adjuvant IV CMF extending from November 2, 1997 through April 27, 1998.\par  \par  The patient subsequently received radiation therapy at [facility] ([facility]) under the supervision of Dr. Rick Mendosa. \par  \par  The patient took tamoxifen from May 1998 through May 2003 and letrozole from March 2006 until March 2011.\par  \par  [facility] demonstrated variant of uncertain significance of BARD1([id number]) on 1/7/2015. [de-identified] : well-differentiated infiltrating ductal carcinoma ER positive LA positive  [Treatment Protocol] : Treatment Protocol [FreeTextEntry1] : On observation. [de-identified] : 4/15/24 Patient returns today for followup for history of breast cancer and to rule out recurrence  Patient denies any breast masses, breast tenderness,  or nipple discharge. Patient denies any SOB, CP, abdominal pain, bone pain, headache, or unexplained weight loss Rashes have resolved; following with endocrinology for osteoporosis and diabetes  Bilateral Mammogram 12/2023 at [facility] Bilateral Mammo/Sono 11/2022 at [facility] -- F/up 1 year MRI Breast 11/11/21 no evidence of malignancy BIRADS1; benign skin lesion RLOQ 7mm   HEALTH MAINTENANCE Last saw PCP Dr Claude Bridges ub 2/2024 Last eye exam late 2020, with benign findings. Beginnings of cataract.   is ophtho CARDS Dr. Misael Garcia -  ENDO Dr. Aguilar- DM on metformin BID DEXA 1/2023 - Osteoporosis - started on actonel 2 months ago Last colonoscopy 08/18/2020, no polyps found. Due in 3 yrs. Family hx of colon cancer.  GYN Dr Mtz, 9/2023. Denies vaginal spotting/bleeding. Most recent dermatology evaluation 1/2021. Four Moh's  procedure for precancerous lesions. Patient states she is up-to-date.. Active - cardio and weight lifting   [100: Normal, no complaints, no evidence of disease.] : 100: Normal, no complaints, no evidence of disease. [ECOG Performance Status: 0 - Fully active, able to carry on all pre-disease performance without restriction] : Performance Status: 0 - Fully active, able to carry on all pre-disease performance without restriction

## 2024-04-15 NOTE — ASSESSMENT
[FreeTextEntry1] : Stage I infiltrating ductal carcinoma right breast 1997  Status post lumpectomy/ALND  Status post CMF  Status post RT  Status post tamoxifen x5 years  Status post letrozole x5 years.  Mammo 11/2023 done at [facility]; will obtain reports; if dense breasts, warrants getting sonogram   BMD 1/2023 c/w osteoporosis; following with PMD and endocrine  - held actonel when she developed rash/dermatitis that has now resolved    Clinically [facility]   Patient had the opportunity to have all their questions answered to their satisfaction   FU in 6 months

## 2024-04-15 NOTE — PHYSICAL EXAM
[Fully active, able to carry on all pre-disease performance without restriction] : Status 0 - Fully active, able to carry on all pre-disease performance without restriction [Thin] : thin [de-identified] : Right breast: scars UIQ and axilla,  no mass. Left breast: scar UIQ,fibrocystic changes, no mass; dense breasts b/l; no nipple discharge illicted; no adenopathy bilateral; no erythema or swelling of the breast. [Normal] : affect appropriate

## 2024-07-26 ENCOUNTER — LABORATORY RESULT (OUTPATIENT)
Age: 79
End: 2024-07-26

## 2024-07-26 DIAGNOSIS — Z00.00 ENCOUNTER FOR GENERAL ADULT MEDICAL EXAMINATION W/OUT ABNORMAL FINDINGS: ICD-10-CM

## 2024-07-26 DIAGNOSIS — M85.80 OTHER SPECIFIED DISORDERS OF BONE DENSITY AND STRUCTURE, UNSPECIFIED SITE: ICD-10-CM

## 2024-07-26 DIAGNOSIS — E55.9 VITAMIN D DEFICIENCY, UNSPECIFIED: ICD-10-CM

## 2024-07-26 LAB
25(OH)D3 SERPL-MCNC: 62.2 NG/ML
ALBUMIN SERPL ELPH-MCNC: 4.8 G/DL
ALP BLD-CCNC: 79 U/L
ALT SERPL-CCNC: 16 U/L
ANION GAP SERPL CALC-SCNC: 14 MMOL/L
APPEARANCE: CLEAR
AST SERPL-CCNC: 18 U/L
BASOPHILS # BLD AUTO: 0.06 K/UL
BASOPHILS NFR BLD AUTO: 1.1 %
BILIRUB SERPL-MCNC: 1 MG/DL
BILIRUBIN URINE: NEGATIVE
BLOOD URINE: NEGATIVE
BUN SERPL-MCNC: 11 MG/DL
CALCIUM SERPL-MCNC: 9.7 MG/DL
CHLORIDE SERPL-SCNC: 101 MMOL/L
CO2 SERPL-SCNC: 28 MMOL/L
COLOR: YELLOW
CREAT SERPL-MCNC: 0.65 MG/DL
CREAT SPEC-SCNC: 142 MG/DL
EGFR: 91 ML/MIN/1.73M2
EOSINOPHIL # BLD AUTO: 0.24 K/UL
EOSINOPHIL NFR BLD AUTO: 4.3 %
GLUCOSE QUALITATIVE U: NEGATIVE MG/DL
GLUCOSE SERPL-MCNC: 152 MG/DL
HCT VFR BLD CALC: 40.4 %
HGB BLD-MCNC: 13.2 G/DL
IMM GRANULOCYTES NFR BLD AUTO: 0.2 %
KETONES URINE: NEGATIVE MG/DL
LEUKOCYTE ESTERASE URINE: ABNORMAL
LYMPHOCYTES # BLD AUTO: 1.64 K/UL
LYMPHOCYTES NFR BLD AUTO: 29.5 %
MAN DIFF?: NORMAL
MCHC RBC-ENTMCNC: 28.9 PG
MCHC RBC-ENTMCNC: 32.7 GM/DL
MCV RBC AUTO: 88.6 FL
MICROALBUMIN 24H UR DL<=1MG/L-MCNC: 2.3 MG/DL
MICROALBUMIN/CREAT 24H UR-RTO: 16 MG/G
MONOCYTES # BLD AUTO: 0.42 K/UL
MONOCYTES NFR BLD AUTO: 7.6 %
NEUTROPHILS # BLD AUTO: 3.18 K/UL
NEUTROPHILS NFR BLD AUTO: 57.3 %
NITRITE URINE: NEGATIVE
PH URINE: 6
PLATELET # BLD AUTO: 312 K/UL
POTASSIUM SERPL-SCNC: 4 MMOL/L
PROT SERPL-MCNC: 7 G/DL
PROTEIN URINE: NORMAL MG/DL
RBC # BLD: 4.56 M/UL
RBC # FLD: 13.8 %
SODIUM SERPL-SCNC: 142 MMOL/L
SPECIFIC GRAVITY URINE: 1.02
TSH SERPL-ACNC: 0.52 UIU/ML
UROBILINOGEN URINE: 0.2 MG/DL
WBC # FLD AUTO: 5.55 K/UL

## 2024-07-27 ENCOUNTER — TRANSCRIPTION ENCOUNTER (OUTPATIENT)
Age: 79
End: 2024-07-27

## 2024-07-27 LAB
ESTIMATED AVERAGE GLUCOSE: 143 MG/DL
HBA1C MFR BLD HPLC: 6.6 %

## 2024-07-31 ENCOUNTER — APPOINTMENT (OUTPATIENT)
Dept: INTERNAL MEDICINE | Facility: CLINIC | Age: 79
End: 2024-07-31
Payer: MEDICARE

## 2024-07-31 VITALS
WEIGHT: 115 LBS | HEIGHT: 65 IN | SYSTOLIC BLOOD PRESSURE: 110 MMHG | BODY MASS INDEX: 19.16 KG/M2 | HEART RATE: 88 BPM | TEMPERATURE: 96.9 F | OXYGEN SATURATION: 98 % | RESPIRATION RATE: 15 BRPM | DIASTOLIC BLOOD PRESSURE: 70 MMHG

## 2024-07-31 DIAGNOSIS — M81.0 AGE-RELATED OSTEOPOROSIS W/OUT CURRENT PATHOLOGICAL FRACTURE: ICD-10-CM

## 2024-07-31 DIAGNOSIS — E78.00 PURE HYPERCHOLESTEROLEMIA, UNSPECIFIED: ICD-10-CM

## 2024-07-31 DIAGNOSIS — E11.9 TYPE 2 DIABETES MELLITUS W/OUT COMPLICATIONS: ICD-10-CM

## 2024-07-31 DIAGNOSIS — C50.911 MALIGNANT NEOPLASM OF UNSPECIFIED SITE OF RIGHT FEMALE BREAST: ICD-10-CM

## 2024-07-31 PROCEDURE — 99213 OFFICE O/P EST LOW 20 MIN: CPT

## 2024-07-31 NOTE — ASSESSMENT
[FreeTextEntry1] : *Type 2 diabetes. A1c currently 6.6%. Diagnosed 2021 after a prolonged period of impaired fasting glucose. Metformin 1000 twice daily, AM 2.5 mg glipizide extended release.Statin no ACE, but no known nephropathy (neg microalb 7/2024); no baby aspirin but no known cardiovascular disease. Saw in rapid succession Dr. Streeter and Dr. Aguilar chooses not to follow with them. Gets annual eye exams no known retinopathy or neuropathy.. SGLT 2 another treatment option. Adequate control without microalbuminuria, continue present management.  Update A1c in 6 months.  *Hyperlipidemia. Atorvastatin 40 without complication. Up-to-date with LFTs lipid panel. Excellent control currently. Lipid 2/2025.  *Osteoporosis. -2.6 hip, -1.4 spine consistent with mild hip osteoporosis restarted on Fosamax 70 mg weekly 2/2024 by rheumatology, however patient is not restarting due to ongoing dental work.  Continue on calcium vitamin D.vD levels around 60, would not does want to see it go any higher but continue present regimen. Wonder about Prolia.  *Family history colon cancer. Father diagnosed with colon cancer age80s after missing routine surveillance colonoscopy in setting of adenomatous polpys.. Has been on every 3 year screening, no polyps on colonoscopy 2017 2020 2023 by Dr. Ha. Next due August 2026.  *Routine adult health maintenance. Health screening labsAugust 2024. Has aged out of HBV HCV HIV screening. Vaccinations: Tdap declined, Prevnar 20 (Prevnar 13 2016) 2023 Shingrix 2020. COVID monovalent booster UTD, influenza defers.  It was a pleasure to visit with Ms. Rao today. Answered questions best I could. Disposition CPE 2/2025 after a1c, lipids Time 25 minutes

## 2024-07-31 NOTE — PHYSICAL EXAM
[Normal] : affect was normal and insight and judgment were intact [de-identified] : onychomycosis

## 2024-07-31 NOTE — HISTORY OF PRESENT ILLNESS
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  She was last seen in February 2024, no med changes made at that time.  Developed a rash on Actonel, has since seen oncology clinically [facility] with regards to breast cancer history remotely, as well as rheumatology who understandably questioned relationship with Actonel, prescribed Fosamax 70 mg weekly without recurrence.  She has no acute concerns.  Started weekly bisphosphonate 7/2023 for interval progression between 2021 and 2023 of her osteopenia to osteoporosis -2.6 left hip total, by rheumatology.  Shortly thereafter developed a rash under her breasts arms torso, described as erythematous nonblanching papules.  Prescribed topical steroid by dermatology along with antihistamine without improvement after a couple of months stopped bisphosphonate.  She indicates rapid resolution, has not started alt rx. Having dental work done.

## 2024-10-09 ENCOUNTER — OUTPATIENT (OUTPATIENT)
Dept: OUTPATIENT SERVICES | Facility: HOSPITAL | Age: 79
LOS: 1 days | Discharge: HOME | End: 2024-10-09

## 2024-10-09 DIAGNOSIS — C50.919 MALIGNANT NEOPLASM OF UNSPECIFIED SITE OF UNSPECIFIED FEMALE BREAST: ICD-10-CM

## 2024-10-21 ENCOUNTER — APPOINTMENT (OUTPATIENT)
Dept: HEMATOLOGY ONCOLOGY | Facility: CLINIC | Age: 79
End: 2024-10-21
Payer: MEDICARE

## 2024-10-21 VITALS
SYSTOLIC BLOOD PRESSURE: 145 MMHG | HEART RATE: 76 BPM | BODY MASS INDEX: 19.08 KG/M2 | OXYGEN SATURATION: 99 % | TEMPERATURE: 97.1 F | WEIGHT: 114.64 LBS | RESPIRATION RATE: 14 BRPM | DIASTOLIC BLOOD PRESSURE: 72 MMHG

## 2024-10-21 DIAGNOSIS — C50.911 MALIGNANT NEOPLASM OF UNSPECIFIED SITE OF RIGHT FEMALE BREAST: ICD-10-CM

## 2024-10-21 PROCEDURE — 99213 OFFICE O/P EST LOW 20 MIN: CPT

## 2025-01-11 DIAGNOSIS — C50.911 MALIGNANT NEOPLASM OF UNSPECIFIED SITE OF RIGHT FEMALE BREAST: ICD-10-CM

## 2025-01-11 DIAGNOSIS — Z00.00 ENCOUNTER FOR GENERAL ADULT MEDICAL EXAMINATION W/OUT ABNORMAL FINDINGS: ICD-10-CM

## 2025-01-17 ENCOUNTER — APPOINTMENT (OUTPATIENT)
Dept: INTERNAL MEDICINE | Facility: CLINIC | Age: 80
End: 2025-01-17
Payer: MEDICARE

## 2025-01-17 VITALS
WEIGHT: 117 LBS | BODY MASS INDEX: 19.49 KG/M2 | OXYGEN SATURATION: 98 % | RESPIRATION RATE: 14 BRPM | HEIGHT: 65 IN | SYSTOLIC BLOOD PRESSURE: 132 MMHG | HEART RATE: 85 BPM | TEMPERATURE: 97.6 F | DIASTOLIC BLOOD PRESSURE: 60 MMHG

## 2025-01-17 DIAGNOSIS — E53.8 DEFICIENCY OF OTHER SPECIFIED B GROUP VITAMINS: ICD-10-CM

## 2025-01-17 DIAGNOSIS — E11.9 TYPE 2 DIABETES MELLITUS W/OUT COMPLICATIONS: ICD-10-CM

## 2025-01-17 DIAGNOSIS — M81.0 AGE-RELATED OSTEOPOROSIS W/OUT CURRENT PATHOLOGICAL FRACTURE: ICD-10-CM

## 2025-01-17 DIAGNOSIS — E55.9 VITAMIN D DEFICIENCY, UNSPECIFIED: ICD-10-CM

## 2025-01-17 LAB
CHOLEST SERPL-MCNC: 140 MG/DL
ESTIMATED AVERAGE GLUCOSE: 143 MG/DL
HBA1C MFR BLD HPLC: 6.6 %
HCT VFR BLD CALC: 38.1 %
HDLC SERPL-MCNC: 69 MG/DL
HGB BLD-MCNC: 12.3 G/DL
LDLC SERPL CALC-MCNC: 60 MG/DL
NONHDLC SERPL-MCNC: 71 MG/DL
TRIGL SERPL-MCNC: 53 MG/DL
VIT B12 SERPL-MCNC: 185 PG/ML

## 2025-01-17 PROCEDURE — 99213 OFFICE O/P EST LOW 20 MIN: CPT

## 2025-01-24 ENCOUNTER — TRANSCRIPTION ENCOUNTER (OUTPATIENT)
Age: 80
End: 2025-01-24

## 2025-01-27 ENCOUNTER — APPOINTMENT (OUTPATIENT)
Dept: INTERNAL MEDICINE | Facility: CLINIC | Age: 80
End: 2025-01-27

## 2025-03-07 ENCOUNTER — APPOINTMENT (OUTPATIENT)
Dept: RHEUMATOLOGY | Facility: CLINIC | Age: 80
End: 2025-03-07
Payer: MEDICARE

## 2025-03-07 VITALS
HEIGHT: 65 IN | BODY MASS INDEX: 19.66 KG/M2 | WEIGHT: 118 LBS | HEART RATE: 80 BPM | TEMPERATURE: 97.4 F | RESPIRATION RATE: 16 BRPM | OXYGEN SATURATION: 99 % | SYSTOLIC BLOOD PRESSURE: 130 MMHG | DIASTOLIC BLOOD PRESSURE: 78 MMHG

## 2025-03-07 DIAGNOSIS — M81.0 AGE-RELATED OSTEOPOROSIS W/OUT CURRENT PATHOLOGICAL FRACTURE: ICD-10-CM

## 2025-03-07 DIAGNOSIS — M54.32 SCIATICA, LEFT SIDE: ICD-10-CM

## 2025-03-07 PROCEDURE — G2211 COMPLEX E/M VISIT ADD ON: CPT

## 2025-03-07 PROCEDURE — 99214 OFFICE O/P EST MOD 30 MIN: CPT

## 2025-03-14 ENCOUNTER — OUTPATIENT (OUTPATIENT)
Dept: OUTPATIENT SERVICES | Facility: HOSPITAL | Age: 80
LOS: 1 days | End: 2025-03-14
Payer: MEDICARE

## 2025-03-14 ENCOUNTER — APPOINTMENT (OUTPATIENT)
Dept: RADIOLOGY | Facility: HOSPITAL | Age: 80
End: 2025-03-14

## 2025-03-14 DIAGNOSIS — M81.0 AGE-RELATED OSTEOPOROSIS WITHOUT CURRENT PATHOLOGICAL FRACTURE: ICD-10-CM

## 2025-03-14 PROCEDURE — 77080 DXA BONE DENSITY AXIAL: CPT | Mod: 26

## 2025-03-14 PROCEDURE — 77080 DXA BONE DENSITY AXIAL: CPT

## 2025-04-04 ENCOUNTER — OUTPATIENT (OUTPATIENT)
Dept: OUTPATIENT SERVICES | Facility: HOSPITAL | Age: 80
LOS: 1 days | Discharge: HOME | End: 2025-04-04

## 2025-04-07 ENCOUNTER — APPOINTMENT (OUTPATIENT)
Dept: HEMATOLOGY ONCOLOGY | Facility: CLINIC | Age: 80
End: 2025-04-07
Payer: MEDICARE

## 2025-04-07 VITALS
RESPIRATION RATE: 16 BRPM | TEMPERATURE: 97.1 F | BODY MASS INDEX: 19.71 KG/M2 | HEIGHT: 64.76 IN | WEIGHT: 116.84 LBS | HEART RATE: 83 BPM | OXYGEN SATURATION: 99 % | DIASTOLIC BLOOD PRESSURE: 77 MMHG | SYSTOLIC BLOOD PRESSURE: 138 MMHG

## 2025-04-07 DIAGNOSIS — M81.0 AGE-RELATED OSTEOPOROSIS W/OUT CURRENT PATHOLOGICAL FRACTURE: ICD-10-CM

## 2025-04-07 DIAGNOSIS — C50.911 MALIGNANT NEOPLASM OF UNSPECIFIED SITE OF RIGHT FEMALE BREAST: ICD-10-CM

## 2025-04-07 DIAGNOSIS — E53.8 DEFICIENCY OF OTHER SPECIFIED B GROUP VITAMINS: ICD-10-CM

## 2025-04-07 PROCEDURE — 99214 OFFICE O/P EST MOD 30 MIN: CPT

## 2025-04-07 PROCEDURE — G2211 COMPLEX E/M VISIT ADD ON: CPT

## 2025-04-14 ENCOUNTER — APPOINTMENT (OUTPATIENT)
Dept: HEMATOLOGY ONCOLOGY | Facility: CLINIC | Age: 80
End: 2025-04-14

## 2025-06-02 ENCOUNTER — APPOINTMENT (OUTPATIENT)
Dept: INTERNAL MEDICINE | Facility: CLINIC | Age: 80
End: 2025-06-02
Payer: MEDICARE

## 2025-06-02 VITALS
TEMPERATURE: 97.2 F | HEART RATE: 95 BPM | WEIGHT: 117 LBS | HEIGHT: 64.76 IN | SYSTOLIC BLOOD PRESSURE: 116 MMHG | RESPIRATION RATE: 16 BRPM | BODY MASS INDEX: 19.73 KG/M2 | OXYGEN SATURATION: 98 % | DIASTOLIC BLOOD PRESSURE: 72 MMHG

## 2025-06-02 DIAGNOSIS — C50.911 MALIGNANT NEOPLASM OF UNSPECIFIED SITE OF RIGHT FEMALE BREAST: ICD-10-CM

## 2025-06-02 DIAGNOSIS — E78.00 PURE HYPERCHOLESTEROLEMIA, UNSPECIFIED: ICD-10-CM

## 2025-06-02 DIAGNOSIS — Z00.00 ENCOUNTER FOR GENERAL ADULT MEDICAL EXAMINATION W/OUT ABNORMAL FINDINGS: ICD-10-CM

## 2025-06-02 DIAGNOSIS — E55.9 VITAMIN D DEFICIENCY, UNSPECIFIED: ICD-10-CM

## 2025-06-02 DIAGNOSIS — E11.9 TYPE 2 DIABETES MELLITUS W/OUT COMPLICATIONS: ICD-10-CM

## 2025-06-02 DIAGNOSIS — E53.8 DEFICIENCY OF OTHER SPECIFIED B GROUP VITAMINS: ICD-10-CM

## 2025-06-02 DIAGNOSIS — M81.0 AGE-RELATED OSTEOPOROSIS W/OUT CURRENT PATHOLOGICAL FRACTURE: ICD-10-CM

## 2025-06-02 PROCEDURE — G0439: CPT

## 2025-06-10 ENCOUNTER — NON-APPOINTMENT (OUTPATIENT)
Age: 80
End: 2025-06-10

## 2025-07-16 ENCOUNTER — APPOINTMENT (OUTPATIENT)
Dept: RHEUMATOLOGY | Facility: CLINIC | Age: 80
End: 2025-07-16

## 2025-07-18 ENCOUNTER — APPOINTMENT (OUTPATIENT)
Dept: RHEUMATOLOGY | Facility: CLINIC | Age: 80
End: 2025-07-18
Payer: MEDICARE

## 2025-07-18 PROCEDURE — 99213 OFFICE O/P EST LOW 20 MIN: CPT | Mod: 93

## 2025-08-25 ENCOUNTER — LABORATORY RESULT (OUTPATIENT)
Age: 80
End: 2025-08-25

## 2025-09-03 ENCOUNTER — APPOINTMENT (OUTPATIENT)
Dept: RADIOLOGY | Facility: HOSPITAL | Age: 80
End: 2025-09-03
Payer: MEDICARE

## 2025-09-03 ENCOUNTER — OUTPATIENT (OUTPATIENT)
Dept: OUTPATIENT SERVICES | Facility: HOSPITAL | Age: 80
LOS: 1 days | End: 2025-09-03
Payer: MEDICARE

## 2025-09-03 ENCOUNTER — APPOINTMENT (OUTPATIENT)
Dept: INTERNAL MEDICINE | Facility: CLINIC | Age: 80
End: 2025-09-03
Payer: MEDICARE

## 2025-09-03 VITALS
RESPIRATION RATE: 14 BRPM | SYSTOLIC BLOOD PRESSURE: 116 MMHG | OXYGEN SATURATION: 97 % | HEIGHT: 64.76 IN | HEART RATE: 74 BPM | BODY MASS INDEX: 19.23 KG/M2 | TEMPERATURE: 98.1 F | DIASTOLIC BLOOD PRESSURE: 60 MMHG | WEIGHT: 114 LBS

## 2025-09-03 DIAGNOSIS — E53.8 DEFICIENCY OF OTHER SPECIFIED B GROUP VITAMINS: ICD-10-CM

## 2025-09-03 DIAGNOSIS — Z00.00 ENCOUNTER FOR GENERAL ADULT MEDICAL EXAMINATION W/OUT ABNORMAL FINDINGS: ICD-10-CM

## 2025-09-03 DIAGNOSIS — E11.9 TYPE 2 DIABETES MELLITUS W/OUT COMPLICATIONS: ICD-10-CM

## 2025-09-03 DIAGNOSIS — R31.29 OTHER MICROSCOPIC HEMATURIA: ICD-10-CM

## 2025-09-03 DIAGNOSIS — C50.911 MALIGNANT NEOPLASM OF UNSPECIFIED SITE OF RIGHT FEMALE BREAST: ICD-10-CM

## 2025-09-03 DIAGNOSIS — M81.0 AGE-RELATED OSTEOPOROSIS W/OUT CURRENT PATHOLOGICAL FRACTURE: ICD-10-CM

## 2025-09-03 DIAGNOSIS — Z77.22 CONTACT WITH AND (SUSPECTED) EXPOSURE TO ENVIRONMENTAL TOBACCO SMOKE (ACUTE) (CHRONIC): ICD-10-CM

## 2025-09-03 PROCEDURE — 71046 X-RAY EXAM CHEST 2 VIEWS: CPT

## 2025-09-03 PROCEDURE — 71046 X-RAY EXAM CHEST 2 VIEWS: CPT | Mod: 26

## 2025-09-03 PROCEDURE — G0439: CPT

## 2025-09-05 ENCOUNTER — APPOINTMENT (OUTPATIENT)
Dept: CT IMAGING | Facility: HOSPITAL | Age: 80
End: 2025-09-05
Payer: MEDICARE

## 2025-09-05 ENCOUNTER — OUTPATIENT (OUTPATIENT)
Dept: OUTPATIENT SERVICES | Facility: HOSPITAL | Age: 80
LOS: 1 days | End: 2025-09-05
Payer: MEDICARE

## 2025-09-05 DIAGNOSIS — R31.29 OTHER MICROSCOPIC HEMATURIA: ICD-10-CM

## 2025-09-05 LAB
CREAT SERPL-MCNC: 0.58 MG/DL
EGFRCR SERPLBLD CKD-EPI 2021: 93 ML/MIN/1.73M2

## 2025-09-05 PROCEDURE — 74178 CT ABD&PLV WO CNTR FLWD CNTR: CPT | Mod: 26

## 2025-09-05 PROCEDURE — 74178 CT ABD&PLV WO CNTR FLWD CNTR: CPT

## 2025-09-09 ENCOUNTER — TRANSCRIPTION ENCOUNTER (OUTPATIENT)
Age: 80
End: 2025-09-09

## 2025-09-18 ENCOUNTER — APPOINTMENT (OUTPATIENT)
Dept: UROLOGY | Facility: CLINIC | Age: 80
End: 2025-09-18
Payer: MEDICARE

## 2025-09-18 VITALS
DIASTOLIC BLOOD PRESSURE: 68 MMHG | HEART RATE: 81 BPM | OXYGEN SATURATION: 97 % | HEIGHT: 66 IN | SYSTOLIC BLOOD PRESSURE: 110 MMHG | WEIGHT: 114 LBS | BODY MASS INDEX: 18.32 KG/M2

## 2025-09-18 DIAGNOSIS — R91.8 OTHER NONSPECIFIC ABNORMAL FINDING OF LUNG FIELD: ICD-10-CM

## 2025-09-18 PROCEDURE — 99204 OFFICE O/P NEW MOD 45 MIN: CPT

## 2025-09-19 ENCOUNTER — APPOINTMENT (OUTPATIENT)
Dept: INTERNAL MEDICINE | Facility: CLINIC | Age: 80
End: 2025-09-19
Payer: MEDICARE

## 2025-09-19 ENCOUNTER — APPOINTMENT (OUTPATIENT)
Age: 80
End: 2025-09-19

## 2025-09-19 VITALS
HEIGHT: 66 IN | WEIGHT: 114 LBS | SYSTOLIC BLOOD PRESSURE: 112 MMHG | DIASTOLIC BLOOD PRESSURE: 60 MMHG | OXYGEN SATURATION: 98 % | HEART RATE: 85 BPM | TEMPERATURE: 98.1 F | BODY MASS INDEX: 18.32 KG/M2 | RESPIRATION RATE: 16 BRPM

## 2025-09-19 DIAGNOSIS — R31.29 OTHER MICROSCOPIC HEMATURIA: ICD-10-CM

## 2025-09-19 DIAGNOSIS — D35.00 BENIGN NEOPLASM OF UNSPECIFIED ADRENAL GLAND: ICD-10-CM

## 2025-09-19 DIAGNOSIS — R91.8 OTHER NONSPECIFIC ABNORMAL FINDING OF LUNG FIELD: ICD-10-CM

## 2025-09-19 DIAGNOSIS — K86.2 CYST OF PANCREAS: ICD-10-CM

## 2025-09-19 PROCEDURE — 99214 OFFICE O/P EST MOD 30 MIN: CPT

## 2025-09-22 LAB
APPEARANCE: ABNORMAL
BACTERIA UR CULT: ABNORMAL
BACTERIA: NEGATIVE /HPF
BILIRUBIN URINE: NEGATIVE
BLOOD URINE: NEGATIVE
CALCIUM OXALATE CRYSTALS: PRESENT
CAST: 2 /LPF
COLOR: YELLOW
EPITHELIAL CELLS: 0 /HPF
GLUCOSE QUALITATIVE U: NEGATIVE MG/DL
KETONES URINE: NEGATIVE MG/DL
LEUKOCYTE ESTERASE URINE: ABNORMAL
MICROSCOPIC-UA: NORMAL
NITRITE URINE: NEGATIVE
PH URINE: 6
PROTEIN URINE: NEGATIVE MG/DL
RED BLOOD CELLS URINE: 2 /HPF
REVIEW: NORMAL
SPECIFIC GRAVITY URINE: 1.02
UROBILINOGEN URINE: 0.2 MG/DL
WHITE BLOOD CELLS URINE: 6 /HPF

## (undated) DEVICE — ENDOCUFF VISION SZ 2 LG GRN

## (undated) DEVICE — SOL IRR POUR H2O 1000ML

## (undated) DEVICE — FORCEP RADIAL JAW 4 240CM DISP

## (undated) DEVICE — SNARE EXACTO COLD 9MMX230CM

## (undated) DEVICE — CONTAINER FORMALIN BUFF 10% 60ML

## (undated) DEVICE — TUBING CAP SET ERBEFLO CLEVERCAP HYBRID CO2 FOR OLYMPUS SCOPES AND UCR

## (undated) DEVICE — POLY TRAP ETRAP

## (undated) DEVICE — SNARE POLYP SENS SM 13MM 240CM

## (undated) DEVICE — KIT ENDO PROCEDURE CUST W/VLV

## (undated) DEVICE — PLATE NESSY 170